# Patient Record
Sex: FEMALE | Race: WHITE | NOT HISPANIC OR LATINO | Employment: UNEMPLOYED | URBAN - METROPOLITAN AREA
[De-identification: names, ages, dates, MRNs, and addresses within clinical notes are randomized per-mention and may not be internally consistent; named-entity substitution may affect disease eponyms.]

---
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
## 2019-09-02 ENCOUNTER — HOSPITAL ENCOUNTER (EMERGENCY)
Facility: HOSPITAL | Age: 19
Discharge: HOME | End: 2019-09-02
Attending: FAMILY MEDICINE
Payer: MEDICAID

## 2019-09-02 VITALS
TEMPERATURE: 98 F | HEIGHT: 66 IN | BODY MASS INDEX: 47.09 KG/M2 | OXYGEN SATURATION: 98 % | RESPIRATION RATE: 16 BRPM | SYSTOLIC BLOOD PRESSURE: 137 MMHG | DIASTOLIC BLOOD PRESSURE: 94 MMHG | WEIGHT: 293 LBS | HEART RATE: 81 BPM

## 2019-09-02 DIAGNOSIS — K08.89 PAIN, DENTAL: Primary | ICD-10-CM

## 2019-09-02 PROCEDURE — 99283 EMERGENCY DEPT VISIT LOW MDM: CPT

## 2019-09-02 RX ORDER — MELOXICAM 15 MG/1
15 TABLET ORAL DAILY
Qty: 40 TABLET | Refills: 1 | Status: SHIPPED | OUTPATIENT
Start: 2019-09-02 | End: 2021-09-16

## 2019-09-02 NOTE — ED PROVIDER NOTES
Encounter Date: 2019       History     Chief Complaint   Patient presents with    Dental Pain     19-year-old female presents complaining of pain to her right upper molar area I think I have gingivitis, patient also complains having dull substernal chest discomfort and is concerned about heartburn        Review of patient's allergies indicates:  No Known Allergies  Past Medical History:   Diagnosis Date    Dermoid cyst of scalp     Dizzy     Heart murmur of      Obesity      Past Surgical History:   Procedure Laterality Date    Excision of cyst of scalp  2018    EXCISION-MASS-HEAD/SCALP Left 2018    Performed by Jaylen Anderson MD at [facility] OR     Family History   Problem Relation Age of Onset    No Known Problems Mother     No Known Problems Father      Social History     Tobacco Use    Smoking status: Never Smoker    Smokeless tobacco: Never Used   Substance Use Topics    Alcohol use: No    Drug use: No     Review of Systems   Constitutional: Negative for fever.   HENT: Negative for sore throat.    Respiratory: Negative for shortness of breath.    Cardiovascular: Negative for chest pain.   Gastrointestinal: Negative for nausea.   Genitourinary: Negative for dysuria.   Musculoskeletal: Negative for back pain.   Skin: Negative for rash.   Neurological: Negative for weakness.   Hematological: Does not bruise/bleed easily.       Physical Exam     Initial Vitals [19 0946]   BP Pulse Resp Temp SpO2   (!) 137/94 81 16 98.2 °F (36.8 °C) 98 %      MAP       --         Physical Exam    Nursing note and vitals reviewed.  Constitutional: She appears well-developed and well-nourished. She is not diaphoretic. No distress.   HENT:   Head: Normocephalic and atraumatic.   Right Ear: External ear normal.   Left Ear: External ear normal.   Eyes: Pupils are equal, round, and reactive to light. Right eye exhibits no discharge. Left eye exhibits no discharge.   Neck: No tracheal deviation  present. No JVD present.   Cardiovascular: Exam reveals no friction rub.    No murmur heard.  Pulmonary/Chest: No stridor. No respiratory distress. She has no wheezes. She has no rales.   Abdominal: Bowel sounds are normal. She exhibits no distension.   Musculoskeletal: Normal range of motion.   Neurological: She is alert.   Skin: Skin is warm.   Psychiatric: She has a normal mood and affect.         ED Course   Procedures  Labs Reviewed - No data to display       Imaging Results    None                               Clinical Impression:       ICD-10-CM ICD-9-CM   1. Pain, dental K08.89 525.9                                Dylan Hendrix MD  09/02/19 1839

## 2021-07-06 ENCOUNTER — PATIENT OUTREACH (OUTPATIENT)
Dept: ADMINISTRATIVE | Facility: HOSPITAL | Age: 21
End: 2021-07-06

## 2021-08-12 ENCOUNTER — OFFICE VISIT (OUTPATIENT)
Dept: FAMILY MEDICINE | Facility: CLINIC | Age: 21
End: 2021-08-12
Payer: MEDICAID

## 2021-08-12 ENCOUNTER — LAB VISIT (OUTPATIENT)
Dept: FAMILY MEDICINE | Facility: CLINIC | Age: 21
End: 2021-08-12
Payer: MEDICAID

## 2021-08-12 VITALS
DIASTOLIC BLOOD PRESSURE: 83 MMHG | SYSTOLIC BLOOD PRESSURE: 121 MMHG | HEIGHT: 66 IN | OXYGEN SATURATION: 99 % | RESPIRATION RATE: 16 BRPM | HEART RATE: 104 BPM | WEIGHT: 293 LBS | BODY MASS INDEX: 47.09 KG/M2

## 2021-08-12 DIAGNOSIS — Z13.1 ENCOUNTER FOR SCREENING FOR DIABETES MELLITUS: ICD-10-CM

## 2021-08-12 DIAGNOSIS — Z82.49 FAMILY HISTORY OF HEART DISEASE IN FEMALE FAMILY MEMBER BEFORE AGE 65: ICD-10-CM

## 2021-08-12 DIAGNOSIS — Z13.220 ENCOUNTER FOR LIPID SCREENING FOR CARDIOVASCULAR DISEASE: ICD-10-CM

## 2021-08-12 DIAGNOSIS — Z13.6 ENCOUNTER FOR LIPID SCREENING FOR CARDIOVASCULAR DISEASE: ICD-10-CM

## 2021-08-12 DIAGNOSIS — Z82.49 FAMILY HISTORY OF HEART DISEASE IN MALE FAMILY MEMBER BEFORE AGE 55: ICD-10-CM

## 2021-08-12 DIAGNOSIS — E66.01 CLASS 3 SEVERE OBESITY WITH BODY MASS INDEX (BMI) OF 45.0 TO 49.9 IN ADULT, UNSPECIFIED OBESITY TYPE, UNSPECIFIED WHETHER SERIOUS COMORBIDITY PRESENT: Chronic | ICD-10-CM

## 2021-08-12 DIAGNOSIS — Z11.59 ENCOUNTER FOR HEPATITIS C SCREENING TEST FOR LOW RISK PATIENT: ICD-10-CM

## 2021-08-12 DIAGNOSIS — R00.2 PALPITATIONS: ICD-10-CM

## 2021-08-12 DIAGNOSIS — Z76.89 ESTABLISHING CARE WITH NEW DOCTOR, ENCOUNTER FOR: Primary | ICD-10-CM

## 2021-08-12 DIAGNOSIS — E66.01 CLASS 3 SEVERE OBESITY WITH BODY MASS INDEX (BMI) OF 45.0 TO 49.9 IN ADULT, UNSPECIFIED OBESITY TYPE, UNSPECIFIED WHETHER SERIOUS COMORBIDITY PRESENT: ICD-10-CM

## 2021-08-12 LAB
ALBUMIN SERPL BCP-MCNC: 3.7 G/DL (ref 3.5–5.2)
ALP SERPL-CCNC: 61 U/L (ref 55–135)
ALT SERPL W/O P-5'-P-CCNC: 18 U/L (ref 10–44)
ANION GAP SERPL CALC-SCNC: 7 MMOL/L (ref 8–16)
AST SERPL-CCNC: 18 U/L (ref 10–40)
BILIRUB SERPL-MCNC: 0.6 MG/DL (ref 0.1–1)
BUN SERPL-MCNC: 9 MG/DL (ref 6–20)
CALCIUM SERPL-MCNC: 8.6 MG/DL (ref 8.7–10.5)
CHLORIDE SERPL-SCNC: 106 MMOL/L (ref 95–110)
CHOLEST SERPL-MCNC: 136 MG/DL (ref 120–199)
CHOLEST/HDLC SERPL: 3.6 {RATIO} (ref 2–5)
CO2 SERPL-SCNC: 25 MMOL/L (ref 23–29)
CREAT SERPL-MCNC: 0.8 MG/DL (ref 0.5–1.4)
EST. GFR  (AFRICAN AMERICAN): >60 ML/MIN/1.73 M^2
EST. GFR  (NON AFRICAN AMERICAN): >60 ML/MIN/1.73 M^2
ESTIMATED AVG GLUCOSE: 91 MG/DL (ref 68–131)
GLUCOSE SERPL-MCNC: 102 MG/DL (ref 70–110)
HBA1C MFR BLD: 4.8 % (ref 4–5.6)
HDLC SERPL-MCNC: 38 MG/DL (ref 40–75)
HDLC SERPL: 27.9 % (ref 20–50)
LDLC SERPL CALC-MCNC: 83.8 MG/DL (ref 63–159)
NONHDLC SERPL-MCNC: 98 MG/DL
POTASSIUM SERPL-SCNC: 3.7 MMOL/L (ref 3.5–5.1)
PROT SERPL-MCNC: 7.4 G/DL (ref 6–8.4)
SODIUM SERPL-SCNC: 138 MMOL/L (ref 136–145)
T4 FREE SERPL-MCNC: 1.15 NG/DL (ref 0.71–1.51)
TRIGL SERPL-MCNC: 71 MG/DL (ref 30–150)
TSH SERPL DL<=0.005 MIU/L-ACNC: 2.87 UIU/ML (ref 0.4–4)

## 2021-08-12 PROCEDURE — 93010 EKG 12-LEAD: ICD-10-PCS | Mod: S$PBB,,, | Performed by: INTERNAL MEDICINE

## 2021-08-12 PROCEDURE — 93005 ELECTROCARDIOGRAM TRACING: CPT | Mod: PBBFAC,PN | Performed by: INTERNAL MEDICINE

## 2021-08-12 PROCEDURE — 80053 COMPREHEN METABOLIC PANEL: CPT | Performed by: FAMILY MEDICINE

## 2021-08-12 PROCEDURE — 86803 HEPATITIS C AB TEST: CPT | Performed by: FAMILY MEDICINE

## 2021-08-12 PROCEDURE — 99205 OFFICE O/P NEW HI 60 MIN: CPT | Mod: S$PBB,,, | Performed by: FAMILY MEDICINE

## 2021-08-12 PROCEDURE — 93010 ELECTROCARDIOGRAM REPORT: CPT | Mod: S$PBB,,, | Performed by: INTERNAL MEDICINE

## 2021-08-12 PROCEDURE — 83036 HEMOGLOBIN GLYCOSYLATED A1C: CPT | Performed by: FAMILY MEDICINE

## 2021-08-12 PROCEDURE — 84439 ASSAY OF FREE THYROXINE: CPT | Performed by: FAMILY MEDICINE

## 2021-08-12 PROCEDURE — 84443 ASSAY THYROID STIM HORMONE: CPT | Performed by: FAMILY MEDICINE

## 2021-08-12 PROCEDURE — 99214 OFFICE O/P EST MOD 30 MIN: CPT | Mod: PBBFAC,PN | Performed by: FAMILY MEDICINE

## 2021-08-12 PROCEDURE — 99999 PR PBB SHADOW E&M-EST. PATIENT-LVL IV: ICD-10-PCS | Mod: PBBFAC,,, | Performed by: FAMILY MEDICINE

## 2021-08-12 PROCEDURE — 99999 PR PBB SHADOW E&M-EST. PATIENT-LVL IV: CPT | Mod: PBBFAC,,, | Performed by: FAMILY MEDICINE

## 2021-08-12 PROCEDURE — 99205 PR OFFICE/OUTPT VISIT, NEW, LEVL V, 60-74 MIN: ICD-10-PCS | Mod: S$PBB,,, | Performed by: FAMILY MEDICINE

## 2021-08-12 PROCEDURE — 80061 LIPID PANEL: CPT | Performed by: FAMILY MEDICINE

## 2021-08-13 LAB — HCV AB SERPL QL IA: NEGATIVE

## 2021-08-14 ENCOUNTER — HOSPITAL ENCOUNTER (EMERGENCY)
Facility: HOSPITAL | Age: 21
Discharge: HOME | End: 2021-08-14
Payer: MEDICAID

## 2021-08-14 VITALS
RESPIRATION RATE: 18 BRPM | HEIGHT: 66 IN | TEMPERATURE: 99 F | DIASTOLIC BLOOD PRESSURE: 88 MMHG | HEART RATE: 102 BPM | BODY MASS INDEX: 47.09 KG/M2 | SYSTOLIC BLOOD PRESSURE: 132 MMHG | OXYGEN SATURATION: 100 % | WEIGHT: 293 LBS

## 2021-08-14 DIAGNOSIS — U07.1 COVID-19: Primary | ICD-10-CM

## 2021-08-14 LAB — SARS-COV-2 RDRP RESP QL NAA+PROBE: POSITIVE

## 2021-08-14 PROCEDURE — 99284 EMERGENCY DEPT VISIT MOD MDM: CPT

## 2021-08-14 PROCEDURE — U0002 COVID-19 LAB TEST NON-CDC: HCPCS | Performed by: EMERGENCY MEDICINE

## 2021-08-14 RX ORDER — BENZONATATE 100 MG/1
100 CAPSULE ORAL 3 TIMES DAILY PRN
Qty: 20 CAPSULE | Refills: 0 | Status: SHIPPED | OUTPATIENT
Start: 2021-08-14 | End: 2021-08-24

## 2021-08-14 RX ORDER — AZITHROMYCIN 250 MG/1
250 TABLET, FILM COATED ORAL DAILY
Qty: 6 TABLET | Refills: 0 | Status: SHIPPED | OUTPATIENT
Start: 2021-08-14 | End: 2021-09-16 | Stop reason: ALTCHOICE

## 2021-08-16 ENCOUNTER — PATIENT OUTREACH (OUTPATIENT)
Dept: ADMINISTRATIVE | Facility: HOSPITAL | Age: 21
End: 2021-08-16

## 2021-08-16 ENCOUNTER — PATIENT MESSAGE (OUTPATIENT)
Dept: ADMINISTRATIVE | Facility: HOSPITAL | Age: 21
End: 2021-08-16

## 2021-08-16 ENCOUNTER — HOSPITAL ENCOUNTER (EMERGENCY)
Facility: HOSPITAL | Age: 21
Discharge: HOME | End: 2021-08-16
Payer: MEDICAID

## 2021-08-16 ENCOUNTER — NURSE TRIAGE (OUTPATIENT)
Dept: ADMINISTRATIVE | Facility: CLINIC | Age: 21
End: 2021-08-16

## 2021-08-16 VITALS
TEMPERATURE: 99 F | RESPIRATION RATE: 20 BRPM | HEIGHT: 66 IN | SYSTOLIC BLOOD PRESSURE: 157 MMHG | WEIGHT: 293 LBS | DIASTOLIC BLOOD PRESSURE: 99 MMHG | BODY MASS INDEX: 47.09 KG/M2 | HEART RATE: 115 BPM | OXYGEN SATURATION: 100 %

## 2021-08-16 DIAGNOSIS — U07.1 COVID-19: Primary | ICD-10-CM

## 2021-08-16 PROCEDURE — 99283 EMERGENCY DEPT VISIT LOW MDM: CPT

## 2021-08-16 RX ORDER — ALBUTEROL SULFATE 90 UG/1
1-2 AEROSOL, METERED RESPIRATORY (INHALATION) EVERY 6 HOURS PRN
Qty: 18 G | Refills: 1 | Status: SHIPPED | OUTPATIENT
Start: 2021-08-16 | End: 2023-09-27 | Stop reason: HOSPADM

## 2021-08-17 ENCOUNTER — INFUSION (OUTPATIENT)
Dept: INFECTIOUS DISEASES | Facility: HOSPITAL | Age: 21
End: 2021-08-17
Attending: EMERGENCY MEDICINE
Payer: MEDICAID

## 2021-08-17 VITALS
TEMPERATURE: 100 F | SYSTOLIC BLOOD PRESSURE: 123 MMHG | RESPIRATION RATE: 20 BRPM | DIASTOLIC BLOOD PRESSURE: 84 MMHG | HEART RATE: 109 BPM | OXYGEN SATURATION: 99 %

## 2021-08-17 DIAGNOSIS — U07.1 COVID-19: ICD-10-CM

## 2021-08-17 PROCEDURE — 63600175 PHARM REV CODE 636 W HCPCS: Performed by: EMERGENCY MEDICINE

## 2021-08-17 PROCEDURE — M0243 CASIRIVI AND IMDEVI INFUSION: HCPCS | Performed by: EMERGENCY MEDICINE

## 2021-08-17 PROCEDURE — 25000003 PHARM REV CODE 250: Performed by: EMERGENCY MEDICINE

## 2021-08-17 RX ORDER — SODIUM CHLORIDE 0.9 % (FLUSH) 0.9 %
10 SYRINGE (ML) INJECTION
Status: ACTIVE | OUTPATIENT
Start: 2021-08-17

## 2021-08-17 RX ORDER — ONDANSETRON 4 MG/1
4 TABLET, ORALLY DISINTEGRATING ORAL ONCE AS NEEDED
Status: DISCONTINUED | OUTPATIENT
Start: 2021-08-17 | End: 2023-02-20

## 2021-08-17 RX ORDER — DIPHENHYDRAMINE HYDROCHLORIDE 50 MG/ML
25 INJECTION INTRAMUSCULAR; INTRAVENOUS ONCE AS NEEDED
Status: ACTIVE | OUTPATIENT
Start: 2021-08-17 | End: 2033-01-13

## 2021-08-17 RX ORDER — ALBUTEROL SULFATE 90 UG/1
2 AEROSOL, METERED RESPIRATORY (INHALATION)
Status: DISPENSED | OUTPATIENT
Start: 2021-08-17

## 2021-08-17 RX ORDER — EPINEPHRINE 0.3 MG/.3ML
0.3 INJECTION SUBCUTANEOUS
Status: ACTIVE | OUTPATIENT
Start: 2021-08-17

## 2021-08-17 RX ORDER — ACETAMINOPHEN 325 MG/1
650 TABLET ORAL ONCE AS NEEDED
Status: ACTIVE | OUTPATIENT
Start: 2021-08-17 | End: 2033-01-13

## 2021-08-17 RX ADMIN — CASIRIVIMAB AND IMDEVIMAB 1200 MG: 600; 600 INJECTION, SOLUTION, CONCENTRATE INTRAVENOUS at 09:08

## 2021-08-25 ENCOUNTER — LAB VISIT (OUTPATIENT)
Dept: FAMILY MEDICINE | Facility: CLINIC | Age: 21
End: 2021-08-25
Payer: MEDICAID

## 2021-08-25 DIAGNOSIS — Z11.52 ENCOUNTER FOR SCREENING FOR COVID-19: Primary | ICD-10-CM

## 2021-08-25 PROCEDURE — U0003 INFECTIOUS AGENT DETECTION BY NUCLEIC ACID (DNA OR RNA); SEVERE ACUTE RESPIRATORY SYNDROME CORONAVIRUS 2 (SARS-COV-2) (CORONAVIRUS DISEASE [COVID-19]), AMPLIFIED PROBE TECHNIQUE, MAKING USE OF HIGH THROUGHPUT TECHNOLOGIES AS DESCRIBED BY CMS-2020-01-R: HCPCS | Performed by: FAMILY MEDICINE

## 2021-08-25 PROCEDURE — U0005 INFEC AGEN DETEC AMPLI PROBE: HCPCS | Performed by: FAMILY MEDICINE

## 2021-08-26 ENCOUNTER — OFFICE VISIT (OUTPATIENT)
Dept: FAMILY MEDICINE | Facility: CLINIC | Age: 21
End: 2021-08-26
Payer: MEDICAID

## 2021-08-26 DIAGNOSIS — Z71.3 WEIGHT LOSS COUNSELING, ENCOUNTER FOR: ICD-10-CM

## 2021-08-26 DIAGNOSIS — U07.1 COVID-19: Primary | ICD-10-CM

## 2021-08-26 DIAGNOSIS — E66.01 CLASS 3 SEVERE OBESITY WITH BODY MASS INDEX (BMI) OF 45.0 TO 49.9 IN ADULT, UNSPECIFIED OBESITY TYPE, UNSPECIFIED WHETHER SERIOUS COMORBIDITY PRESENT: ICD-10-CM

## 2021-08-26 LAB
SARS-COV-2 RNA RESP QL NAA+PROBE: DETECTED
SARS-COV-2- CYCLE NUMBER: 33

## 2021-08-26 PROCEDURE — 99214 OFFICE O/P EST MOD 30 MIN: CPT | Mod: GT,,, | Performed by: FAMILY MEDICINE

## 2021-08-26 PROCEDURE — 99214 PR OFFICE/OUTPT VISIT, EST, LEVL IV, 30-39 MIN: ICD-10-PCS | Mod: GT,,, | Performed by: FAMILY MEDICINE

## 2021-08-26 RX ORDER — AZELASTINE 1 MG/ML
1 SPRAY, METERED NASAL 2 TIMES DAILY
Qty: 30 ML | Refills: 3 | Status: SHIPPED | OUTPATIENT
Start: 2021-08-26 | End: 2023-09-27

## 2021-09-09 ENCOUNTER — DOCUMENTATION ONLY (OUTPATIENT)
Dept: FAMILY MEDICINE | Facility: CLINIC | Age: 21
End: 2021-09-09

## 2021-09-16 ENCOUNTER — OFFICE VISIT (OUTPATIENT)
Dept: CARDIOLOGY | Facility: CLINIC | Age: 21
End: 2021-09-16
Payer: MEDICAID

## 2021-09-16 VITALS
SYSTOLIC BLOOD PRESSURE: 169 MMHG | OXYGEN SATURATION: 99 % | RESPIRATION RATE: 16 BRPM | BODY MASS INDEX: 47.09 KG/M2 | HEIGHT: 66 IN | WEIGHT: 293 LBS | HEART RATE: 98 BPM | DIASTOLIC BLOOD PRESSURE: 98 MMHG

## 2021-09-16 DIAGNOSIS — G47.19 EXCESSIVE DAYTIME SLEEPINESS: ICD-10-CM

## 2021-09-16 DIAGNOSIS — E78.6 LOW HDL (UNDER 40): ICD-10-CM

## 2021-09-16 DIAGNOSIS — Z82.49 FAMILY HISTORY OF HEART DISEASE IN MALE FAMILY MEMBER BEFORE AGE 55: ICD-10-CM

## 2021-09-16 DIAGNOSIS — R01.1 HEART MURMUR: ICD-10-CM

## 2021-09-16 DIAGNOSIS — R00.2 PALPITATIONS: Primary | ICD-10-CM

## 2021-09-16 DIAGNOSIS — Z82.0 FAMILY HISTORY OF SLEEP APNEA: ICD-10-CM

## 2021-09-16 DIAGNOSIS — E66.01 MORBID OBESITY: ICD-10-CM

## 2021-09-16 DIAGNOSIS — Z82.49 FAMILY HISTORY OF HEART DISEASE IN FEMALE FAMILY MEMBER BEFORE AGE 65: ICD-10-CM

## 2021-09-16 PROBLEM — R74.8 LOW SERUM HDL: Status: ACTIVE | Noted: 2021-09-16

## 2021-09-16 PROBLEM — H40.9 GLAUCOMA: Status: ACTIVE | Noted: 2021-09-16

## 2021-09-16 PROCEDURE — 99205 OFFICE O/P NEW HI 60 MIN: CPT | Mod: S$PBB,,, | Performed by: INTERNAL MEDICINE

## 2021-09-16 PROCEDURE — 99214 OFFICE O/P EST MOD 30 MIN: CPT | Mod: PBBFAC,PN | Performed by: INTERNAL MEDICINE

## 2021-09-16 PROCEDURE — 99999 PR PBB SHADOW E&M-EST. PATIENT-LVL IV: ICD-10-PCS | Mod: PBBFAC,,, | Performed by: INTERNAL MEDICINE

## 2021-09-16 PROCEDURE — 99999 PR PBB SHADOW E&M-EST. PATIENT-LVL IV: CPT | Mod: PBBFAC,,, | Performed by: INTERNAL MEDICINE

## 2021-09-16 PROCEDURE — 99205 PR OFFICE/OUTPT VISIT, NEW, LEVL V, 60-74 MIN: ICD-10-PCS | Mod: S$PBB,,, | Performed by: INTERNAL MEDICINE

## 2021-09-29 ENCOUNTER — HOSPITAL ENCOUNTER (EMERGENCY)
Facility: HOSPITAL | Age: 21
Discharge: HOME | End: 2021-09-29
Attending: EMERGENCY MEDICINE
Payer: MEDICAID

## 2021-09-29 VITALS
HEIGHT: 67 IN | DIASTOLIC BLOOD PRESSURE: 61 MMHG | SYSTOLIC BLOOD PRESSURE: 126 MMHG | RESPIRATION RATE: 18 BRPM | HEART RATE: 88 BPM | OXYGEN SATURATION: 100 % | WEIGHT: 293 LBS | TEMPERATURE: 98 F | BODY MASS INDEX: 45.99 KG/M2
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
## 2021-09-29 DIAGNOSIS — T14.8XXA BITE BY ANIMAL: ICD-10-CM

## 2021-09-29 DIAGNOSIS — S41.112A LACERATION OF LEFT UPPER EXTREMITY, INITIAL ENCOUNTER: Primary | ICD-10-CM

## 2021-09-29 PROCEDURE — 90472 IMMUNIZATION ADMIN EACH ADD: CPT | Performed by: EMERGENCY MEDICINE

## 2021-09-29 PROCEDURE — 90675 RABIES VACCINE IM: CPT | Mod: JG,UD | Performed by: EMERGENCY MEDICINE

## 2021-09-29 PROCEDURE — 90715 TDAP VACCINE 7 YRS/> IM: CPT | Performed by: EMERGENCY MEDICINE

## 2021-09-29 PROCEDURE — 25000003 PHARM REV CODE 250: Performed by: EMERGENCY MEDICINE

## 2021-09-29 PROCEDURE — 73110 XR WRIST COMPLETE 3 VIEWS LEFT: ICD-10-PCS | Mod: 26,LT,, | Performed by: RADIOLOGY

## 2021-09-29 PROCEDURE — 99284 EMERGENCY DEPT VISIT MOD MDM: CPT | Mod: 25

## 2021-09-29 PROCEDURE — 63600175 PHARM REV CODE 636 W HCPCS: Performed by: EMERGENCY MEDICINE

## 2021-09-29 PROCEDURE — 73110 X-RAY EXAM OF WRIST: CPT | Mod: TC,FY,LT

## 2021-09-29 PROCEDURE — 73110 X-RAY EXAM OF WRIST: CPT | Mod: 26,LT,, | Performed by: RADIOLOGY

## 2021-09-29 PROCEDURE — 96372 THER/PROPH/DIAG INJ SC/IM: CPT

## 2021-09-29 PROCEDURE — 90471 IMMUNIZATION ADMIN: CPT | Performed by: EMERGENCY MEDICINE

## 2021-09-29 PROCEDURE — 12002 RPR S/N/AX/GEN/TRNK2.6-7.5CM: CPT

## 2021-09-29 RX ORDER — KETOROLAC TROMETHAMINE 30 MG/ML
15 INJECTION, SOLUTION INTRAMUSCULAR; INTRAVENOUS
Status: COMPLETED | OUTPATIENT
Start: 2021-09-29 | End: 2021-09-29

## 2021-09-29 RX ORDER — ACETAMINOPHEN 500 MG
1000 TABLET ORAL
Status: COMPLETED | OUTPATIENT
Start: 2021-09-29 | End: 2021-09-29

## 2021-09-29 RX ORDER — AMOXICILLIN AND CLAVULANATE POTASSIUM 875; 125 MG/1; MG/1
1 TABLET, FILM COATED ORAL 2 TIMES DAILY
Qty: 14 TABLET | Refills: 0 | Status: SHIPPED | OUTPATIENT
Start: 2021-09-29 | End: 2023-09-27

## 2021-09-29 RX ORDER — LIDOCAINE HYDROCHLORIDE AND EPINEPHRINE 10; 10 MG/ML; UG/ML
10 INJECTION, SOLUTION INFILTRATION; PERINEURAL
Status: COMPLETED | OUTPATIENT
Start: 2021-09-29 | End: 2021-09-29

## 2021-09-29 RX ADMIN — LIDOCAINE HYDROCHLORIDE,EPINEPHRINE BITARTRATE 10 ML: 10; .01 INJECTION, SOLUTION INFILTRATION; PERINEURAL at 07:09

## 2021-09-29 RX ADMIN — CLOSTRIDIUM TETANI TOXOID ANTIGEN (FORMALDEHYDE INACTIVATED), CORYNEBACTERIUM DIPHTHERIAE TOXOID ANTIGEN (FORMALDEHYDE INACTIVATED), BORDETELLA PERTUSSIS TOXOID ANTIGEN (GLUTARALDEHYDE INACTIVATED), BORDETELLA PERTUSSIS FILAMENTOUS HEMAGGLUTININ ANTIGEN (FORMALDEHYDE INACTIVATED), BORDETELLA PERTUSSIS PERTACTIN ANTIGEN, AND BORDETELLA PERTUSSIS FIMBRIAE 2/3 ANTIGEN 0.5 ML: 5; 2; 2.5; 5; 3; 5 INJECTION, SUSPENSION INTRAMUSCULAR at 07:09

## 2021-09-29 RX ADMIN — ACETAMINOPHEN 1000 MG: 500 TABLET ORAL at 07:09

## 2021-09-29 RX ADMIN — RABIES VACCINE 2.5 UNITS: KIT at 08:09

## 2021-09-29 RX ADMIN — KETOROLAC TROMETHAMINE 15 MG: 30 INJECTION, SOLUTION INTRAMUSCULAR at 09:09

## 2021-09-30 ENCOUNTER — HOSPITAL ENCOUNTER (EMERGENCY)
Facility: HOSPITAL | Age: 21
Discharge: HOME | End: 2021-09-30
Attending: EMERGENCY MEDICINE
Payer: MEDICAID

## 2021-09-30 VITALS
HEART RATE: 76 BPM | TEMPERATURE: 99 F | RESPIRATION RATE: 18 BRPM | DIASTOLIC BLOOD PRESSURE: 95 MMHG | HEIGHT: 67 IN | WEIGHT: 293 LBS | OXYGEN SATURATION: 100 % | SYSTOLIC BLOOD PRESSURE: 142 MMHG | BODY MASS INDEX: 45.99 KG/M2
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
## 2021-09-30 DIAGNOSIS — Z20.3 NEED FOR POST EXPOSURE PROPHYLAXIS FOR RABIES: ICD-10-CM

## 2021-09-30 PROCEDURE — 90377 RABIES IG HT&SOL HUMAN IM/SC: CPT | Performed by: EMERGENCY MEDICINE

## 2021-09-30 PROCEDURE — 99283 EMERGENCY DEPT VISIT LOW MDM: CPT

## 2021-09-30 PROCEDURE — 63600175 PHARM REV CODE 636 W HCPCS: Performed by: EMERGENCY MEDICINE

## 2021-09-30 RX ORDER — IBUPROFEN 800 MG/1
800 TABLET ORAL EVERY 8 HOURS PRN
Qty: 15 TABLET | Refills: 0 | Status: SHIPPED | OUTPATIENT
Start: 2021-09-30 | End: 2023-09-27

## 2021-09-30 RX ADMIN — HUMAN RABIES VIRUS IMMUNE GLOBULIN 2700 UNITS: 150 INJECTION, SOLUTION INTRAMUSCULAR at 09:09

## 2021-10-08 ENCOUNTER — HOSPITAL ENCOUNTER (EMERGENCY)
Facility: HOSPITAL | Age: 21
Discharge: HOME | End: 2021-10-08
Attending: FAMILY MEDICINE
Payer: MEDICAID

## 2021-10-08 VITALS
WEIGHT: 293 LBS | BODY MASS INDEX: 45.99 KG/M2 | RESPIRATION RATE: 16 BRPM | TEMPERATURE: 98 F | DIASTOLIC BLOOD PRESSURE: 78 MMHG | HEIGHT: 67 IN | HEART RATE: 79 BPM | SYSTOLIC BLOOD PRESSURE: 149 MMHG | OXYGEN SATURATION: 99 %

## 2021-10-08 DIAGNOSIS — Z48.02 VISIT FOR SUTURE REMOVAL: Primary | ICD-10-CM
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
## 2021-10-08 DIAGNOSIS — S91.059S: ICD-10-CM

## 2021-10-08 PROCEDURE — 99281 EMR DPT VST MAYX REQ PHY/QHP: CPT | Mod: 25

## 2021-10-08 PROCEDURE — 63600175 PHARM REV CODE 636 W HCPCS: Mod: JG,UD | Performed by: FAMILY MEDICINE

## 2021-10-08 PROCEDURE — 90675 RABIES VACCINE IM: CPT | Mod: JG,UD | Performed by: FAMILY MEDICINE

## 2021-10-08 PROCEDURE — 99900062 HC AFTER HR RABIES VACCINE ED REGISTRATION

## 2021-10-08 PROCEDURE — 90471 IMMUNIZATION ADMIN: CPT | Performed by: FAMILY MEDICINE

## 2021-10-08 RX ADMIN — RABIES VACCINE 2.5 UNITS: KIT at 07:10

## 2021-10-26 ENCOUNTER — OFFICE VISIT (OUTPATIENT)
Dept: FAMILY MEDICINE | Facility: CLINIC | Age: 21
End: 2021-10-26
Payer: MEDICAID

## 2021-10-26 VITALS
HEART RATE: 77 BPM | BODY MASS INDEX: 45.99 KG/M2 | WEIGHT: 293 LBS | SYSTOLIC BLOOD PRESSURE: 138 MMHG | OXYGEN SATURATION: 98 % | DIASTOLIC BLOOD PRESSURE: 90 MMHG | HEIGHT: 67 IN
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
## 2021-10-26 DIAGNOSIS — S61.552S: Primary | ICD-10-CM

## 2021-10-26 DIAGNOSIS — G47.33 OSA (OBSTRUCTIVE SLEEP APNEA): ICD-10-CM

## 2021-10-26 DIAGNOSIS — D23.4 CYST, DERMOID, SCALP AND NECK: ICD-10-CM

## 2021-10-26 PROCEDURE — 99999 PR PBB SHADOW E&M-EST. PATIENT-LVL V: CPT | Mod: PBBFAC,,, | Performed by: FAMILY MEDICINE

## 2021-10-26 PROCEDURE — 99214 OFFICE O/P EST MOD 30 MIN: CPT | Mod: S$PBB,,, | Performed by: FAMILY MEDICINE

## 2021-10-26 PROCEDURE — 99999 PR PBB SHADOW E&M-EST. PATIENT-LVL V: ICD-10-PCS | Mod: PBBFAC,,, | Performed by: FAMILY MEDICINE

## 2021-10-26 PROCEDURE — 99214 PR OFFICE/OUTPT VISIT, EST, LEVL IV, 30-39 MIN: ICD-10-PCS | Mod: S$PBB,,, | Performed by: FAMILY MEDICINE

## 2021-10-26 PROCEDURE — 99215 OFFICE O/P EST HI 40 MIN: CPT | Mod: PBBFAC,PN | Performed by: FAMILY MEDICINE

## 2021-10-27 ENCOUNTER — TELEPHONE (OUTPATIENT)
Dept: FAMILY MEDICINE | Facility: CLINIC | Age: 21
End: 2021-10-27
Payer: MEDICAID

## 2021-10-27 ENCOUNTER — PATIENT MESSAGE (OUTPATIENT)
Dept: FAMILY MEDICINE | Facility: CLINIC | Age: 21
End: 2021-10-27
Payer: MEDICAID

## 2021-11-08 ENCOUNTER — TELEPHONE (OUTPATIENT)
Dept: SURGERY | Facility: CLINIC | Age: 21
End: 2021-11-08
Payer: MEDICAID

## 2021-11-09 ENCOUNTER — PROCEDURE VISIT (OUTPATIENT)
Dept: SLEEP MEDICINE | Facility: HOSPITAL | Age: 21
End: 2021-11-09
Attending: FAMILY MEDICINE
Payer: MEDICAID

## 2021-11-09 DIAGNOSIS — G47.33 OSA (OBSTRUCTIVE SLEEP APNEA): ICD-10-CM

## 2021-11-09 PROCEDURE — 95810 POLYSOM 6/> YRS 4/> PARAM: CPT

## 2021-11-10 ENCOUNTER — HOSPITAL ENCOUNTER (OUTPATIENT)
Dept: RADIOLOGY | Facility: HOSPITAL | Age: 21
Discharge: HOME | End: 2021-11-10
Attending: FAMILY MEDICINE
Payer: MEDICAID

## 2021-11-10 DIAGNOSIS — S61.552S: ICD-10-CM
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
## 2021-11-10 PROCEDURE — 73200 CT WRIST WITHOUT CONTRAST LEFT: ICD-10-PCS | Mod: 26,LT,, | Performed by: RADIOLOGY

## 2021-11-10 PROCEDURE — 73200 CT UPPER EXTREMITY W/O DYE: CPT | Mod: 26,LT,, | Performed by: RADIOLOGY

## 2021-11-10 PROCEDURE — 73200 CT UPPER EXTREMITY W/O DYE: CPT | Mod: TC,LT

## 2021-11-12 ENCOUNTER — PATIENT MESSAGE (OUTPATIENT)
Dept: FAMILY MEDICINE | Facility: CLINIC | Age: 21
End: 2021-11-12
Payer: MEDICAID

## 2021-12-02 ENCOUNTER — OFFICE VISIT (OUTPATIENT)
Dept: SURGERY | Facility: CLINIC | Age: 21
End: 2021-12-02
Payer: MEDICAID

## 2021-12-02 ENCOUNTER — PATIENT OUTREACH (OUTPATIENT)
Dept: ADMINISTRATIVE | Facility: OTHER | Age: 21
End: 2021-12-02
Payer: MEDICAID

## 2021-12-02 ENCOUNTER — LAB VISIT (OUTPATIENT)
Dept: LAB | Facility: HOSPITAL | Age: 21
End: 2021-12-02
Attending: SURGERY
Payer: MEDICAID

## 2021-12-02 VITALS
HEIGHT: 67 IN | TEMPERATURE: 98 F | DIASTOLIC BLOOD PRESSURE: 80 MMHG | BODY MASS INDEX: 45.99 KG/M2 | OXYGEN SATURATION: 99 % | WEIGHT: 293 LBS | SYSTOLIC BLOOD PRESSURE: 138 MMHG | HEART RATE: 82 BPM

## 2021-12-02 DIAGNOSIS — L72.9 SCALP CYST: Primary | ICD-10-CM

## 2021-12-02 DIAGNOSIS — Z01.818 PRE-OP TESTING: ICD-10-CM

## 2021-12-02 DIAGNOSIS — D23.4 CYST, DERMOID, SCALP AND NECK: ICD-10-CM

## 2021-12-02 LAB
ALBUMIN SERPL BCP-MCNC: 4 G/DL (ref 3.5–5.2)
ALP SERPL-CCNC: 61 U/L (ref 55–135)
ALT SERPL W/O P-5'-P-CCNC: 13 U/L (ref 10–44)
ANION GAP SERPL CALC-SCNC: 9 MMOL/L (ref 8–16)
AST SERPL-CCNC: 20 U/L (ref 10–40)
BASOPHILS # BLD AUTO: 0.05 K/UL (ref 0–0.2)
BASOPHILS NFR BLD: 0.5 % (ref 0–1.9)
BILIRUB SERPL-MCNC: 0.4 MG/DL (ref 0.1–1)
BUN SERPL-MCNC: 9 MG/DL (ref 6–20)
CALCIUM SERPL-MCNC: 8.7 MG/DL (ref 8.7–10.5)
CHLORIDE SERPL-SCNC: 105 MMOL/L (ref 95–110)
CO2 SERPL-SCNC: 24 MMOL/L (ref 23–29)
CREAT SERPL-MCNC: 0.8 MG/DL (ref 0.5–1.4)
DIFFERENTIAL METHOD: NORMAL
EOSINOPHIL # BLD AUTO: 0.1 K/UL (ref 0–0.5)
EOSINOPHIL NFR BLD: 0.8 % (ref 0–8)
ERYTHROCYTE [DISTWIDTH] IN BLOOD BY AUTOMATED COUNT: 11.9 % (ref 11.5–14.5)
EST. GFR  (AFRICAN AMERICAN): >60 ML/MIN/1.73 M^2
EST. GFR  (NON AFRICAN AMERICAN): >60 ML/MIN/1.73 M^2
GLUCOSE SERPL-MCNC: 88 MG/DL (ref 70–110)
HCT VFR BLD AUTO: 41.1 % (ref 37–48.5)
HGB BLD-MCNC: 14 G/DL (ref 12–16)
IMM GRANULOCYTES # BLD AUTO: 0.03 K/UL (ref 0–0.04)
IMM GRANULOCYTES NFR BLD AUTO: 0.3 % (ref 0–0.5)
LYMPHOCYTES # BLD AUTO: 2.6 K/UL (ref 1–4.8)
LYMPHOCYTES NFR BLD: 26.5 % (ref 18–48)
MCH RBC QN AUTO: 29.2 PG (ref 27–31)
MCHC RBC AUTO-ENTMCNC: 34.1 G/DL (ref 32–36)
MCV RBC AUTO: 86 FL (ref 82–98)
MONOCYTES # BLD AUTO: 0.7 K/UL (ref 0.3–1)
MONOCYTES NFR BLD: 7.1 % (ref 4–15)
NEUTROPHILS # BLD AUTO: 6.4 K/UL (ref 1.8–7.7)
NEUTROPHILS NFR BLD: 64.8 % (ref 38–73)
NRBC BLD-RTO: 0 /100 WBC
PLATELET # BLD AUTO: 289 K/UL (ref 150–450)
PMV BLD AUTO: 10.2 FL (ref 9.2–12.9)
POTASSIUM SERPL-SCNC: 4.2 MMOL/L (ref 3.5–5.1)
PROT SERPL-MCNC: 7.6 G/DL (ref 6–8.4)
RBC # BLD AUTO: 4.8 M/UL (ref 4–5.4)
SODIUM SERPL-SCNC: 138 MMOL/L (ref 136–145)
WBC # BLD AUTO: 9.84 K/UL (ref 3.9–12.7)

## 2021-12-02 PROCEDURE — 99204 OFFICE O/P NEW MOD 45 MIN: CPT | Mod: S$PBB,,, | Performed by: SURGERY

## 2021-12-02 PROCEDURE — 99999 PR PBB SHADOW E&M-EST. PATIENT-LVL V: ICD-10-PCS | Mod: PBBFAC,,, | Performed by: SURGERY

## 2021-12-02 PROCEDURE — 36415 COLL VENOUS BLD VENIPUNCTURE: CPT | Performed by: SURGERY

## 2021-12-02 PROCEDURE — 99215 OFFICE O/P EST HI 40 MIN: CPT | Mod: PBBFAC | Performed by: SURGERY

## 2021-12-02 PROCEDURE — 80053 COMPREHEN METABOLIC PANEL: CPT | Performed by: SURGERY

## 2021-12-02 PROCEDURE — 99204 PR OFFICE/OUTPT VISIT, NEW, LEVL IV, 45-59 MIN: ICD-10-PCS | Mod: S$PBB,,, | Performed by: SURGERY

## 2021-12-02 PROCEDURE — 85025 COMPLETE CBC W/AUTO DIFF WBC: CPT | Performed by: SURGERY

## 2021-12-02 PROCEDURE — 99999 PR PBB SHADOW E&M-EST. PATIENT-LVL V: CPT | Mod: PBBFAC,,, | Performed by: SURGERY

## 2021-12-02 RX ORDER — SODIUM CHLORIDE 9 MG/ML
INJECTION, SOLUTION INTRAVENOUS CONTINUOUS
Status: CANCELLED | OUTPATIENT
Start: 2021-12-02

## 2021-12-09 ENCOUNTER — PATIENT MESSAGE (OUTPATIENT)
Dept: FAMILY MEDICINE | Facility: CLINIC | Age: 21
End: 2021-12-09
Payer: MEDICAID

## 2022-01-10 ENCOUNTER — PATIENT MESSAGE (OUTPATIENT)
Dept: ADMINISTRATIVE | Facility: HOSPITAL | Age: 22
End: 2022-01-10
Payer: MEDICAID

## 2022-02-16 ENCOUNTER — ANESTHESIA EVENT (OUTPATIENT)
Dept: SURGERY | Facility: HOSPITAL | Age: 22
End: 2022-02-16
Payer: MEDICAID

## 2022-03-02 ENCOUNTER — HOSPITAL ENCOUNTER (OUTPATIENT)
Facility: HOSPITAL | Age: 22
Discharge: HOME | End: 2022-03-02
Attending: SURGERY | Admitting: SURGERY
Payer: MEDICAID

## 2022-03-02 ENCOUNTER — ANESTHESIA (OUTPATIENT)
Dept: SURGERY | Facility: HOSPITAL | Age: 22
End: 2022-03-02
Payer: MEDICAID

## 2022-03-02 VITALS
WEIGHT: 293 LBS | BODY MASS INDEX: 45.99 KG/M2 | HEIGHT: 67 IN | HEART RATE: 89 BPM | RESPIRATION RATE: 12 BRPM | TEMPERATURE: 98 F | OXYGEN SATURATION: 100 % | SYSTOLIC BLOOD PRESSURE: 130 MMHG | DIASTOLIC BLOOD PRESSURE: 76 MMHG

## 2022-03-02 DIAGNOSIS — D23.4 CYST, DERMOID, SCALP AND NECK: Primary | ICD-10-CM

## 2022-03-02 DIAGNOSIS — L72.9 SCALP CYST: ICD-10-CM

## 2022-03-02 LAB
B-HCG UR QL: NEGATIVE
SARS-COV-2 RDRP RESP QL NAA+PROBE: NEGATIVE

## 2022-03-02 PROCEDURE — 11422 EXC H-F-NK-SP B9+MARG 1.1-2: CPT | Mod: ,,, | Performed by: SURGERY

## 2022-03-02 PROCEDURE — 00300 ANES ALL PX INTEG H/N/PTRUNK: CPT | Performed by: SURGERY

## 2022-03-02 PROCEDURE — 63600175 PHARM REV CODE 636 W HCPCS: Performed by: SURGERY

## 2022-03-02 PROCEDURE — 36000707: Performed by: SURGERY

## 2022-03-02 PROCEDURE — U0002 COVID-19 LAB TEST NON-CDC: HCPCS | Performed by: SURGERY

## 2022-03-02 PROCEDURE — 81025 URINE PREGNANCY TEST: CPT | Performed by: SURGERY

## 2022-03-02 PROCEDURE — 11421 PR EXC SKIN BENIG 0.6-1 CM REMAINDR BODY: ICD-10-PCS | Mod: ,,, | Performed by: SURGERY

## 2022-03-02 PROCEDURE — 88304 TISSUE EXAM BY PATHOLOGIST: CPT | Mod: 26,,, | Performed by: PATHOLOGY

## 2022-03-02 PROCEDURE — 37000008 HC ANESTHESIA 1ST 15 MINUTES: Performed by: SURGERY

## 2022-03-02 PROCEDURE — D9220A PRA ANESTHESIA: ICD-10-PCS | Mod: 23,ANES,, | Performed by: ANESTHESIOLOGY

## 2022-03-02 PROCEDURE — 25000003 PHARM REV CODE 250: Performed by: NURSE ANESTHETIST, CERTIFIED REGISTERED

## 2022-03-02 PROCEDURE — D9220A PRA ANESTHESIA: Mod: 23,ANES,, | Performed by: ANESTHESIOLOGY

## 2022-03-02 PROCEDURE — 36000706: Performed by: SURGERY

## 2022-03-02 PROCEDURE — 88304 PR  SURG PATH,LEVEL III: ICD-10-PCS | Mod: 26,,, | Performed by: PATHOLOGY

## 2022-03-02 PROCEDURE — 11421 EXC H-F-NK-SP B9+MARG 0.6-1: CPT | Mod: ,,, | Performed by: SURGERY

## 2022-03-02 PROCEDURE — 71000033 HC RECOVERY, INTIAL HOUR: Performed by: SURGERY

## 2022-03-02 PROCEDURE — D9220A PRA ANESTHESIA: ICD-10-PCS | Mod: 23,CRNA,, | Performed by: NURSE ANESTHETIST, CERTIFIED REGISTERED

## 2022-03-02 PROCEDURE — 88304 TISSUE EXAM BY PATHOLOGIST: CPT | Mod: 59 | Performed by: PATHOLOGY

## 2022-03-02 PROCEDURE — 71000015 HC POSTOP RECOV 1ST HR: Performed by: SURGERY

## 2022-03-02 PROCEDURE — 11422 PR EXC SKIN BENIG 1.1-2 CM REMAINDR BODY: ICD-10-PCS | Mod: ,,, | Performed by: SURGERY

## 2022-03-02 PROCEDURE — 63600175 PHARM REV CODE 636 W HCPCS: Performed by: NURSE ANESTHETIST, CERTIFIED REGISTERED

## 2022-03-02 PROCEDURE — D9220A PRA ANESTHESIA: Mod: 23,CRNA,, | Performed by: NURSE ANESTHETIST, CERTIFIED REGISTERED

## 2022-03-02 PROCEDURE — 37000009 HC ANESTHESIA EA ADD 15 MINS: Performed by: SURGERY

## 2022-03-02 RX ORDER — LIDOCAINE HYDROCHLORIDE 20 MG/ML
INJECTION, SOLUTION EPIDURAL; INFILTRATION; INTRACAUDAL; PERINEURAL
Status: DISCONTINUED | OUTPATIENT
Start: 2022-03-02 | End: 2022-03-02

## 2022-03-02 RX ORDER — ONDANSETRON 2 MG/ML
INJECTION INTRAMUSCULAR; INTRAVENOUS
Status: DISCONTINUED | OUTPATIENT
Start: 2022-03-02 | End: 2022-03-02

## 2022-03-02 RX ORDER — MORPHINE SULFATE 4 MG/ML
2 INJECTION, SOLUTION INTRAMUSCULAR; INTRAVENOUS EVERY 5 MIN PRN
Status: DISCONTINUED | OUTPATIENT
Start: 2022-03-02 | End: 2022-03-02 | Stop reason: HOSPADM

## 2022-03-02 RX ORDER — MIDAZOLAM HYDROCHLORIDE 1 MG/ML
INJECTION INTRAMUSCULAR; INTRAVENOUS
Status: DISCONTINUED | OUTPATIENT
Start: 2022-03-02 | End: 2022-03-02

## 2022-03-02 RX ORDER — SODIUM CHLORIDE, SODIUM LACTATE, POTASSIUM CHLORIDE, CALCIUM CHLORIDE 600; 310; 30; 20 MG/100ML; MG/100ML; MG/100ML; MG/100ML
125 INJECTION, SOLUTION INTRAVENOUS CONTINUOUS
Status: DISCONTINUED | OUTPATIENT
Start: 2022-03-02 | End: 2022-03-02 | Stop reason: HOSPADM

## 2022-03-02 RX ORDER — ONDANSETRON 4 MG/1
4 TABLET, FILM COATED ORAL EVERY 6 HOURS PRN
Qty: 30 TABLET | Refills: 0 | Status: SHIPPED | OUTPATIENT
Start: 2022-03-02 | End: 2022-03-12

## 2022-03-02 RX ORDER — SUCCINYLCHOLINE CHLORIDE 20 MG/ML
INJECTION INTRAMUSCULAR; INTRAVENOUS
Status: DISCONTINUED | OUTPATIENT
Start: 2022-03-02 | End: 2022-03-02

## 2022-03-02 RX ORDER — ONDANSETRON 2 MG/ML
4 INJECTION INTRAMUSCULAR; INTRAVENOUS DAILY PRN
Status: DISCONTINUED | OUTPATIENT
Start: 2022-03-02 | End: 2022-03-02 | Stop reason: HOSPADM

## 2022-03-02 RX ORDER — DEXAMETHASONE SODIUM PHOSPHATE 4 MG/ML
INJECTION, SOLUTION INTRA-ARTICULAR; INTRALESIONAL; INTRAMUSCULAR; INTRAVENOUS; SOFT TISSUE
Status: DISCONTINUED | OUTPATIENT
Start: 2022-03-02 | End: 2022-03-02

## 2022-03-02 RX ORDER — SODIUM CHLORIDE, SODIUM LACTATE, POTASSIUM CHLORIDE, CALCIUM CHLORIDE 600; 310; 30; 20 MG/100ML; MG/100ML; MG/100ML; MG/100ML
INJECTION, SOLUTION INTRAVENOUS CONTINUOUS PRN
Status: DISCONTINUED | OUTPATIENT
Start: 2022-03-02 | End: 2022-03-02

## 2022-03-02 RX ORDER — SODIUM CHLORIDE 9 MG/ML
INJECTION, SOLUTION INTRAVENOUS CONTINUOUS
Status: DISCONTINUED | OUTPATIENT
Start: 2022-03-02 | End: 2022-03-02 | Stop reason: HOSPADM

## 2022-03-02 RX ORDER — OXYCODONE AND ACETAMINOPHEN 5; 325 MG/1; MG/1
1 TABLET ORAL EVERY 4 HOURS PRN
Qty: 20 TABLET | Refills: 0 | Status: SHIPPED | OUTPATIENT
Start: 2022-03-02 | End: 2022-03-12

## 2022-03-02 RX ORDER — CEFAZOLIN SODIUM 2 G/50ML
2 SOLUTION INTRAVENOUS
Status: COMPLETED | OUTPATIENT
Start: 2022-03-02 | End: 2022-03-02

## 2022-03-02 RX ORDER — OXYCODONE AND ACETAMINOPHEN 5; 325 MG/1; MG/1
1 TABLET ORAL
Status: DISCONTINUED | OUTPATIENT
Start: 2022-03-02 | End: 2022-03-02 | Stop reason: HOSPADM

## 2022-03-02 RX ORDER — LIDOCAINE HYDROCHLORIDE 10 MG/ML
1 INJECTION, SOLUTION EPIDURAL; INFILTRATION; INTRACAUDAL; PERINEURAL ONCE
Status: CANCELLED | OUTPATIENT
Start: 2022-03-02 | End: 2022-03-02

## 2022-03-02 RX ORDER — MEPERIDINE HYDROCHLORIDE 50 MG/ML
INJECTION INTRAMUSCULAR; INTRAVENOUS; SUBCUTANEOUS
Status: DISCONTINUED | OUTPATIENT
Start: 2022-03-02 | End: 2022-03-02

## 2022-03-02 RX ORDER — SODIUM CHLORIDE, SODIUM LACTATE, POTASSIUM CHLORIDE, CALCIUM CHLORIDE 600; 310; 30; 20 MG/100ML; MG/100ML; MG/100ML; MG/100ML
INJECTION, SOLUTION INTRAVENOUS CONTINUOUS
Status: CANCELLED | OUTPATIENT
Start: 2022-03-02

## 2022-03-02 RX ORDER — PROPOFOL 10 MG/ML
VIAL (ML) INTRAVENOUS
Status: DISCONTINUED | OUTPATIENT
Start: 2022-03-02 | End: 2022-03-02

## 2022-03-02 RX ADMIN — PROPOFOL 200 MG: 10 INJECTION, EMULSION INTRAVENOUS at 10:03

## 2022-03-02 RX ADMIN — MEPERIDINE HYDROCHLORIDE 50 MG: 50 INJECTION INTRAMUSCULAR; INTRAVENOUS; SUBCUTANEOUS at 10:03

## 2022-03-02 RX ADMIN — SUCCINYLCHOLINE CHLORIDE 100 MG: 20 INJECTION, SOLUTION INTRAMUSCULAR; INTRAVENOUS at 11:03

## 2022-03-02 RX ADMIN — CEFAZOLIN SODIUM 2 G: 2 SOLUTION INTRAVENOUS at 10:03

## 2022-03-02 RX ADMIN — ONDANSETRON 4 MG: 2 INJECTION INTRAMUSCULAR; INTRAVENOUS at 11:03

## 2022-03-02 RX ADMIN — MIDAZOLAM HYDROCHLORIDE 2 MG: 1 INJECTION, SOLUTION INTRAMUSCULAR; INTRAVENOUS at 10:03

## 2022-03-02 RX ADMIN — LIDOCAINE HYDROCHLORIDE 100 MG: 20 INJECTION, SOLUTION EPIDURAL; INFILTRATION; INTRACAUDAL; PERINEURAL at 10:03

## 2022-03-02 RX ADMIN — SODIUM CHLORIDE, POTASSIUM CHLORIDE, SODIUM LACTATE AND CALCIUM CHLORIDE: 600; 310; 30; 20 INJECTION, SOLUTION INTRAVENOUS at 10:03

## 2022-03-02 RX ADMIN — DEXAMETHASONE SODIUM PHOSPHATE 4 MG: 4 INJECTION, SOLUTION INTRAMUSCULAR; INTRAVENOUS at 11:03

## 2022-03-02 NOTE — ANESTHESIA PROCEDURE NOTES
Intubation    Date/Time: 3/2/2022 11:00 AM  Performed by: Evy You CRNA  Authorized by: Noah Huber MD     Intubation:     Intubated:  Postinduction    Mask Ventilation:  Easy mask    Attempts:  1    Attempted By:  CRNA    Method of Intubation:  Direct    Blade:  Conklin 2    Laryngeal View Grade: Grade I - full view of cords      Difficult Airway Encountered?: No      Complications:  None    Airway Device:  Oral endotracheal tube    Airway Device Size:  7.0    Style/Cuff Inflation:  Cuffed (inflated to minimal occlusive pressure)    Tube secured:  22    Secured at:  The lips    Placement Verified By:  Capnometry    Complicating Factors:  None    Findings Post-Intubation:  BS equal bilateral and atraumatic/condition of teeth unchanged  Notes:      After placement of LMA, Dr Anderson needed the pt right lateral so changed to ETT

## 2022-03-02 NOTE — ANESTHESIA PROCEDURE NOTES
Intubation    Date/Time: 3/2/2022 10:42 AM  Performed by: Evy You CRNA  Authorized by: Noah Huber MD     Intubation:     Induction:  Intravenous    Intubated:  Postinduction    Mask Ventilation:  Easy mask    Attempts:  1    Attempted By:  CRNA    Difficult Airway Encountered?: No      Complications:  None    Airway Device:  Intubating LMA    Airway Device Size:  7.0    Style/Cuff Inflation:  Cuffed (inflated to minimal occlusive pressure)    Secured at:  The lips    Placement Verified By:  Capnometry    Complicating Factors:  None    Findings Post-Intubation:  BS equal bilateral and atraumatic/condition of teeth unchanged

## 2022-03-02 NOTE — H&P
[facility] Surgery H&P Note    Subjective:       Patient ID: Josesito Moore is a 21 y.o. female.    Chief Complaint:  I want my scalp cyst removed.  HPI:  Josesito Moore is a 21 y.o. female history of ADHD depression presents today as a new patient referral for evaluation of scalp cyst.  Patient has previously had scalp cyst removed by me some time ago.  She states she has 1 area behind the left ear in 1 area and the crown of her head this giving her fits.  Increasing pain.  Increasing size.  These were mobile from surrounding structures.  She desires for surgical excision.  She presents today for evaluation.    Past Medical History:   Diagnosis Date    ADHD (attention deficit hyperactivity disorder)     Depression     Dermoid cyst of scalp     Dizzy     Enlarged heart     Heart murmur of      Obesity      Past Surgical History:   Procedure Laterality Date    Excision of cyst of scalp  2018     Family History   Problem Relation Age of Onset    Heart disease Mother     Heart disease Maternal Aunt     Heart disease Maternal Uncle     Heart disease Maternal Grandmother     Diabetes Maternal Grandmother     Heart disease Maternal Grandfather     Diabetes Paternal Grandfather      Social History     Socioeconomic History    Marital status: Single   Tobacco Use    Smoking status: Never Smoker    Smokeless tobacco: Never Used   Substance and Sexual Activity    Alcohol use: No    Drug use: No    Sexual activity: Not Currently     Partners: Female     Birth control/protection: None       Current Facility-Administered Medications   Medication Dose Route Frequency Provider Last Rate Last Admin    0.9%  NaCl infusion   Intravenous Continuous Jaylen Anderson MD        cefazolin (ANCEF) 2 gram in dextrose 5% 50 mL IVPB (premix)  2 g Intravenous On Call Procedure Jaylen Anderson MD         Review of patient's allergies indicates:  No Known Allergies    Review of Systems  "  Constitutional: Negative for activity change, appetite change, chills and fever.   HENT: Negative for congestion, dental problem and ear discharge.    Eyes: Negative for discharge and itching.   Respiratory: Negative for apnea, choking and chest tightness.    Cardiovascular: Negative for chest pain and leg swelling.   Gastrointestinal: Negative for abdominal distention, abdominal pain, anal bleeding, constipation, diarrhea and nausea.   Endocrine: Negative for cold intolerance and heat intolerance.   Genitourinary: Negative for difficulty urinating and dyspareunia.   Musculoskeletal: Negative for arthralgias and back pain.   Skin: Negative for color change and pallor.   Neurological: Negative for dizziness and facial asymmetry.   Hematological: Negative for adenopathy. Does not bruise/bleed easily.   Psychiatric/Behavioral: Negative for agitation and behavioral problems.       Objective:      Vitals:    03/02/22 0852   BP: 119/69   BP Location: Right arm   Patient Position: Lying   Pulse: 75   Resp: 20   Temp: 98.7 °F (37.1 °C)   TempSrc: Oral   SpO2: 100%   Weight: (!) 136.5 kg (301 lb)   Height: 5' 7" (1.702 m)     Physical Exam  Constitutional:       General: She is not in acute distress.     Appearance: She is well-developed.   HENT:      Head: Normocephalic and atraumatic.     Eyes:      Pupils: Pupils are equal, round, and reactive to light.   Neck:      Thyroid: No thyromegaly.   Cardiovascular:      Rate and Rhythm: Normal rate and regular rhythm.   Pulmonary:      Effort: Pulmonary effort is normal.      Breath sounds: Normal breath sounds.   Abdominal:      General: Bowel sounds are normal. There is no distension.      Palpations: Abdomen is soft.      Tenderness: There is no abdominal tenderness.   Musculoskeletal:         General: No deformity. Normal range of motion.      Cervical back: Normal range of motion and neck supple.   Skin:     General: Skin is warm.      Capillary Refill: Capillary refill " takes less than 2 seconds.      Findings: No erythema.   Neurological:      Mental Status: She is alert and oriented to person, place, and time.      Cranial Nerves: No cranial nerve deficit.   Psychiatric:         Behavior: Behavior normal.          Assessment:       1. Scalp cyst    2. Cyst, dermoid, scalp and neck        Plan:   Scalp cyst  -     Case Request Operating Room: EXCISION, SUPERFICIAL MASS, HEAD  -     Place in Outpatient; Standing  -     Vital signs; Standing  -     Verify beta-blocker dose taken within 24 hours if patient is prescribed beta-blocker; Standing  -     Height and weight; Standing  -     Intake and output; Standing  -     Verify discontinuation of antithrombotics; Standing  -     POCT glucose; Standing  -     Verify blood consent; Standing  -     Verify consent; Standing  -     Diet NPO; Standing  -     Place JOANN hose; Standing  -     Place sequential compression device; Standing    Cyst, dermoid, scalp and neck  -     Case Request Operating Room: EXCISION, SUPERFICIAL MASS, HEAD  -     Place in Outpatient; Standing  -     Vital signs; Standing  -     Verify beta-blocker dose taken within 24 hours if patient is prescribed beta-blocker; Standing  -     Height and weight; Standing  -     Intake and output; Standing  -     Verify discontinuation of antithrombotics; Standing  -     POCT glucose; Standing  -     Verify blood consent; Standing  -     Verify consent; Standing  -     Diet NPO; Standing  -     Place JOANN hose; Standing  -     Place sequential compression device; Standing    Other orders  -     0.9%  NaCl infusion  -     IP VTE LOW RISK PATIENT; Standing  -     cefazolin (ANCEF) 2 gram in dextrose 5% 50 mL IVPB (premix)  -     Pregnancy, urine rapid; Standing  -     COVID-19 Rapid Screening; Standing  -     Notify Anesthesiologist if Patient on Home Insulin Pump; Standing  -     POCT glucose; Standing        Medical Decision Making/Counseling:  Scalp cyst, dermoid cyst.  Will  recommend surgical excision.  Increasing pain, increasing size.  Risk benefits of surgical excision have been discussed in detail with the patient and family in clinic today.  After risk benefits discussion, patient voiced understanding risk benefits wished to proceed near future.    Patient instructed that best way to communicate with my office staff is for patient to get on the [facility] epic patient portal to expedite communication and communication issues that may occur.  Patient was given instructions on how to get on the portal.  I encouraged patient to obtain portal access as well.  Ultimately it is up to the patient to obtain access.  Patient voiced understanding.

## 2022-03-02 NOTE — OP NOTE
[place] - Surgery  Operative Note     SUMMARY     Surgery Date: 3/2/2022     Pre-op Diagnosis:  Cyst, dermoid, scalp and neck [D23.4]  Scalp cyst [L72.9]    Post-op Diagnosis:  Post-Op Diagnosis Codes:     * Cyst, dermoid, scalp and neck [D23.4]     * Scalp cyst [L72.9]    Operation:  1) soft tissue incision 1 cm scalp occipital region.  2) soft tissues decision x2, 1 cm piece left posterior auricular region.    Surgeon(s) and Role:     * Jaylen Anderson MD - Primary    Assistant: None    Antibiotics: Ancef 2 gram IV    Estimated Blood Loss: 5cc    Anesthesia:  General    Description of the findings of the procedure:  3 times scalp soft tissue mass, 1 cm, completely excised, 1 op simple region, 2 posterior left auricular region.    Specimens:  Scalp cyst x1 occipital region.  Scalp cyst x2 posterior auricular region left side.    Complications:  None apparent in the OR.    Implants:  None.         Indications for Procedure:     Ms Moore is a 20yo F presented to clinic for evaluation of scalp soft tissue lesions.  Patient desires for operative excision.  Risk benefits were discussed with the patient in detail.  After informed discussion regarding the risk benefits, patient desires to proceed today with procedural intervention.  Lesions in the scalp were marked in preoperative holding.    Procedure:     Patient is brought back into the operative room placed on table supine position.  Anesthesia was given per the anesthesia team, please see separate dictated anesthesia note for details.  A time-out was conducted with all the operative room in agreement correct patient correct procedure correct allergies correct antibiotics.  Patient was given 2 g Ancef prior surgical incision.  Patient's scalp was prepped and draped in standard sterile surgical fashion.    2 cc 0.25% Marcaine with epinephrine is injected the patient's planned surgical sites.  Of simple region was operated on 1st.  Curvilinear incision was made  around the scalp soft tissue lesion.  Skin incision was carried down to periosteum Bovie electric cautery.  Soft tissue lesion was completely excised.  Skin soft tissue was washed out with  irrigant.  Hemostasis was achieved ensured.  Three 0 Vicryl sutures used in a simple subdermal fashion close the sub dermis.  4-0 Prolene suture was used in a simple interrupted fashion close the epidermis.    Attention was turned towards the left posterior auricular region.  There were 2 soft tissue lesions within this region.  Skin incision was made in a curvilinear fashion, lip to call around the soft tissue lesions.  Skin incision is carried down to subcutaneous tissues Bovie electric cautery.  Both soft tissue lesions were removed in their entirety.  Skin soft tissue was then washed out with  irrigant.  Three 0 Vicryl sutures were used in a simple subdermal fashion close the sub dermis.  4-0 Prolene suture was used in a simple interrupted fashion close the epidermis.    Bacitracin and dry dressings were placed over top both operative fields.    Patient tolerated procedure well she was reversed from anesthetic agent transfer back to postop care unit stable condition.  All counts were correct at the end the procedure including lap pads instruments well as needles.  Plan will be bacitracin placement on the wound daily, dry dressings over top, follow-up surgery clinic 2 weeks for pathology review and suture removal.

## 2022-03-02 NOTE — DISCHARGE INSTRUCTIONS

## 2022-03-02 NOTE — TRANSFER OF CARE
"Anesthesia Transfer of Care Note    Patient: Josesito Moore    Procedure(s) Performed: Procedure(s) (LRB):  EXCISION, SUPERFICIAL MASS, HEAD X 2: LEFT EAR AND CROWN OF HEAD (N/A)    Patient location: PACU    Anesthesia Type: general    Transport from OR: Transported from OR on room air with adequate spontaneous ventilation    Post pain: adequate analgesia    Post assessment: no apparent anesthetic complications and tolerated procedure well    Post vital signs: stable    Level of consciousness: awake, alert and oriented    Nausea/Vomiting: no nausea/vomiting    Complications: none    Transfer of care protocol was followed      Last vitals:   Visit Vitals  /69 (BP Location: Right arm, Patient Position: Lying)   Pulse 75   Temp 37.1 °C (98.7 °F) (Oral)   Resp 20   Ht 5' 7" (1.702 m)   Wt (!) 136.5 kg (301 lb)   SpO2 100%   Breastfeeding No   BMI 47.14 kg/m²     "

## 2022-03-02 NOTE — DISCHARGE SUMMARY
Discharge Note        SUMMARY     Admit Date: 3/2/2022    Attending Physician: Jaylen Anderson MD     Discharge Physician: Jaylen Anderson MD    Discharge Date: 3/2/2022 11:37 AM      Hospital Course: Patient tolerated procedure well.     Disposition: Home or Self Care    Patient Instructions:   Current Discharge Medication List      START taking these medications    Details   ondansetron (ZOFRAN) 4 MG tablet Take 1 tablet (4 mg total) by mouth every 6 (six) hours as needed for Nausea.  Qty: 30 tablet, Refills: 0      oxyCODONE-acetaminophen (PERCOCET) 5-325 mg per tablet Take 1 tablet by mouth every 4 (four) hours as needed for Pain.  Qty: 20 tablet, Refills: 0    Comments: n/a          CONTINUE these medications which have NOT CHANGED    Details   albuterol (PROVENTIL/VENTOLIN HFA) 90 mcg/actuation inhaler Inhale 1-2 puffs into the lungs every 6 (six) hours as needed for Wheezing. Rescue  Qty: 18 g, Refills: 1      amoxicillin-clavulanate 875-125mg (AUGMENTIN) 875-125 mg per tablet Take 1 tablet by mouth 2 (two) times daily.  Qty: 14 tablet, Refills: 0    Associated Diagnoses: Dog bite, initial encounter      azelastine (ASTELIN) 137 mcg (0.1 %) nasal spray 1 spray (137 mcg total) by Nasal route 2 (two) times daily.  Qty: 30 mL, Refills: 3    Associated Diagnoses: COVID-19      ibuprofen (ADVIL,MOTRIN) 800 MG tablet Take 1 tablet (800 mg total) by mouth every 8 (eight) hours as needed for Pain.  Qty: 15 tablet, Refills: 0    Associated Diagnoses: Dog bite, initial encounter             Discharge Procedure Orders (must include Diet, Follow-up, Activity):   Discharge Procedure Orders (must include Diet, Follow-up, Activity)   Diet general     Lifting restrictions   Order Comments: No heavy lifting, pushing, pulling, bending, strenuous exercise greater than 10 lb for the next 6 weeks.     Other restrictions (specify):   Order Comments: No swimming or submersion of wounds in lakes, ponds, streams,  oceans, bathtubs, etc until seen in clinic for postoperative evaluation.     No dressing needed     Call MD for:  temperature >100.4     Call MD for:  persistent nausea and vomiting     Call MD for:  severe uncontrolled pain     Call MD for:  difficulty breathing, headache or visual disturbances     Call MD for:  redness, tenderness, or signs of infection (pain, swelling, redness, odor or green/yellow discharge around incision site)     Call MD for:  persistent dizziness or light-headedness        Follow Up:  Follow up as scheduled.  Resume routine diet.  Activity as tolerated.    No driving day of procedure.

## 2022-03-02 NOTE — PLAN OF CARE
Has met unit/department guidelines for discharge from each phase of the post procedure continuum. Leaving floor per w/c with RN. WILFRED x3. Resp even and unlabored room air. No distress noted.. Incision sites x2 remain approximated without observed problems to site. Remains approximated. Tolerating PO fluids without c/o nausea/vomiting. All personal belongings returned to pt. Instructed pt she may take a pain pill when she feels she needs a dose. V/u.

## 2022-03-02 NOTE — ANESTHESIA PREPROCEDURE EVALUATION
03/02/2022  Josesito Moore is a 21 y.o., female.      Pre-op Assessment    I have reviewed the Patient Summary Reports.     I have reviewed the Nursing Notes.    I have reviewed the Medications.     Review of Systems  Anesthesia Hx:  No problems with previous Anesthesia  Neg history of prior surgery. Denies Family Hx of Anesthesia complications.   Denies Personal Hx of Anesthesia complications.   Social:  Non-Smoker    Hematology/Oncology:  Hematology Normal   Oncology Normal     EENT/Dental:EENT/Dental Normal   Cardiovascular:  Cardiovascular Normal     Pulmonary:  Pulmonary Normal    Renal/:  Renal/ Normal     Hepatic/GI:  Hepatic/GI Normal    Musculoskeletal:  Musculoskeletal Normal    Neurological:  Neurology Normal    Endocrine:  Endocrine Normal    Dermatological:  Skin Normal    Psych:  Psychiatric Normal  ADHD         Physical Exam  General: Alert    Airway:  Mallampati: II   Mouth Opening: Normal  TM Distance: Normal  Neck ROM: Normal ROM    Dental:  Intact        Anesthesia Plan  Type of Anesthesia, risks & benefits discussed:    Anesthesia Type: Gen ETT  Post Op Pain Control Plan: multimodal analgesia  Airway Plan: Direct, Post-Induction  Informed Consent: Patient consented to blood products? No  ASA Score: 2  Day of Surgery Review of History & Physical: H&P Update referred to the surgeon/provider.    Ready For Surgery From Anesthesia Perspective.     .

## 2022-03-03 NOTE — ANESTHESIA POSTPROCEDURE EVALUATION
Anesthesia Post Evaluation    Patient: Josesito Moore    Procedure(s) Performed: Procedure(s) (LRB):  EXCISION, SUPERFICIAL MASS, HEAD X 2: LEFT EAR AND CROWN OF HEAD (N/A)    Final Anesthesia Type: general      Patient location during evaluation: PACU  Patient participation: Yes- Able to Participate  Level of consciousness: awake and awake and alert  Post-procedure vital signs: reviewed and stable  Pain management: adequate  Airway patency: patent    PONV status at discharge: No PONV  Anesthetic complications: no      Cardiovascular status: blood pressure returned to baseline  Respiratory status: unassisted and spontaneous ventilation  Hydration status: euvolemic  Follow-up not needed.          Vitals Value Taken Time   /76 03/02/22 1217   Temp 36.7 °C (98.1 °F) 03/02/22 1215   Pulse 89 03/02/22 1225   Resp 12 03/02/22 1225   SpO2 100 % 03/02/22 1225   Vitals shown include unvalidated device data.      Event Time   Out of Recovery 12:15:00         Pain/Rufino Score: Rufino Score: 9 (3/2/2022 12:00 PM)  Modified Rufino Score: 20 (3/2/2022 12:30 PM)

## 2022-03-04 ENCOUNTER — PATIENT MESSAGE (OUTPATIENT)
Dept: SURGERY | Facility: CLINIC | Age: 22
End: 2022-03-04
Payer: MEDICAID

## 2022-03-15 LAB
FINAL PATHOLOGIC DIAGNOSIS: NORMAL
Lab: NORMAL

## 2022-04-05 ENCOUNTER — TELEPHONE (OUTPATIENT)
Dept: PRIMARY CARE CLINIC | Facility: CLINIC | Age: 22
End: 2022-04-05
Payer: MEDICAID

## 2022-05-31 ENCOUNTER — PATIENT MESSAGE (OUTPATIENT)
Dept: ADMINISTRATIVE | Facility: HOSPITAL | Age: 22
End: 2022-05-31
Payer: MEDICAID

## 2022-10-06 ENCOUNTER — HOSPITAL ENCOUNTER (EMERGENCY)
Facility: HOSPITAL | Age: 22
Discharge: HOME | End: 2022-10-07
Attending: EMERGENCY MEDICINE
Payer: MEDICAID

## 2022-10-06 DIAGNOSIS — G43.909 MIGRAINE WITHOUT STATUS MIGRAINOSUS, NOT INTRACTABLE, UNSPECIFIED MIGRAINE TYPE: Primary | ICD-10-CM

## 2022-10-06 PROCEDURE — 25000003 PHARM REV CODE 250: Performed by: EMERGENCY MEDICINE

## 2022-10-06 PROCEDURE — 99285 EMERGENCY DEPT VISIT HI MDM: CPT

## 2022-10-06 PROCEDURE — 63600175 PHARM REV CODE 636 W HCPCS: Performed by: EMERGENCY MEDICINE

## 2022-10-06 PROCEDURE — 96372 THER/PROPH/DIAG INJ SC/IM: CPT | Performed by: EMERGENCY MEDICINE

## 2022-10-06 RX ORDER — HYDROCODONE BITARTRATE AND ACETAMINOPHEN 10; 325 MG/1; MG/1
1 TABLET ORAL
Status: COMPLETED | OUTPATIENT
Start: 2022-10-06 | End: 2022-10-06

## 2022-10-06 RX ORDER — KETOROLAC TROMETHAMINE 30 MG/ML
60 INJECTION, SOLUTION INTRAMUSCULAR; INTRAVENOUS
Status: COMPLETED | OUTPATIENT
Start: 2022-10-06 | End: 2022-10-06

## 2022-10-06 RX ORDER — ONDANSETRON 4 MG/1
4 TABLET, ORALLY DISINTEGRATING ORAL
Status: COMPLETED | OUTPATIENT
Start: 2022-10-06 | End: 2022-10-06

## 2022-10-06 RX ADMIN — KETOROLAC TROMETHAMINE 60 MG: 30 INJECTION, SOLUTION INTRAMUSCULAR; INTRAVENOUS at 11:10

## 2022-10-06 RX ADMIN — ONDANSETRON 4 MG: 4 TABLET, ORALLY DISINTEGRATING ORAL at 11:10

## 2022-10-06 RX ADMIN — HYDROCODONE BITARTRATE AND ACETAMINOPHEN 1 TABLET: 10; 325 TABLET ORAL at 11:10

## 2022-10-06 NOTE — Clinical Note
"Josesito"Jay Moore was seen and treated in our emergency department on 10/6/2022.  She may return to work on 10/10/2022.       If you have any questions or concerns, please don't hesitate to call.      BETHANIE Saxena RN RN    "

## 2022-10-07 VITALS
HEIGHT: 67 IN | SYSTOLIC BLOOD PRESSURE: 114 MMHG | TEMPERATURE: 99 F | OXYGEN SATURATION: 100 % | HEART RATE: 59 BPM | BODY MASS INDEX: 45.04 KG/M2 | DIASTOLIC BLOOD PRESSURE: 72 MMHG | RESPIRATION RATE: 18 BRPM | WEIGHT: 287 LBS

## 2022-10-07 PROCEDURE — 63600175 PHARM REV CODE 636 W HCPCS: Performed by: EMERGENCY MEDICINE

## 2022-10-07 PROCEDURE — 96372 THER/PROPH/DIAG INJ SC/IM: CPT | Performed by: EMERGENCY MEDICINE

## 2022-10-07 RX ORDER — MORPHINE SULFATE 4 MG/ML
8 INJECTION, SOLUTION INTRAMUSCULAR; INTRAVENOUS
Status: COMPLETED | OUTPATIENT
Start: 2022-10-07 | End: 2022-10-07

## 2022-10-07 RX ORDER — BUTALBITAL, ACETAMINOPHEN AND CAFFEINE 50; 325; 40 MG/1; MG/1; MG/1
2 TABLET ORAL EVERY 6 HOURS PRN
Qty: 20 TABLET | Refills: 0 | Status: SHIPPED | OUTPATIENT
Start: 2022-10-07 | End: 2022-11-06

## 2022-10-07 RX ADMIN — MORPHINE SULFATE 8 MG: 4 INJECTION, SOLUTION INTRAMUSCULAR; INTRAVENOUS at 12:10

## 2022-10-25 ENCOUNTER — PROCEDURE VISIT (OUTPATIENT)
Dept: SLEEP MEDICINE | Facility: HOSPITAL | Age: 22
End: 2022-10-25
Attending: INTERNAL MEDICINE
Payer: MEDICAID

## 2022-10-25 DIAGNOSIS — G47.33 OSA (OBSTRUCTIVE SLEEP APNEA): ICD-10-CM

## 2022-10-25 PROCEDURE — 95811 POLYSOM 6/>YRS CPAP 4/> PARM: CPT

## 2022-11-13 ENCOUNTER — PATIENT MESSAGE (OUTPATIENT)
Dept: FAMILY MEDICINE | Facility: CLINIC | Age: 22
End: 2022-11-13
Payer: MEDICAID

## 2023-02-20 ENCOUNTER — HOSPITAL ENCOUNTER (EMERGENCY)
Facility: HOSPITAL | Age: 23
Discharge: HOME | End: 2023-02-21
Attending: EMERGENCY MEDICINE
Payer: MEDICAID

## 2023-02-20 VITALS
WEIGHT: 286 LBS | OXYGEN SATURATION: 97 % | TEMPERATURE: 99 F | RESPIRATION RATE: 20 BRPM | HEART RATE: 106 BPM | HEIGHT: 67 IN | DIASTOLIC BLOOD PRESSURE: 98 MMHG | BODY MASS INDEX: 44.89 KG/M2 | SYSTOLIC BLOOD PRESSURE: 138 MMHG

## 2023-02-20 DIAGNOSIS — R07.9 CHEST PAIN: ICD-10-CM

## 2023-02-20 DIAGNOSIS — K20.90 ESOPHAGITIS: ICD-10-CM

## 2023-02-20 DIAGNOSIS — K52.9 GASTROENTERITIS: Primary | ICD-10-CM

## 2023-02-20 DIAGNOSIS — R00.2 PALPITATIONS: ICD-10-CM

## 2023-02-20 LAB
ALBUMIN SERPL BCP-MCNC: 4.3 G/DL (ref 3.5–5.2)
ALP SERPL-CCNC: 69 U/L (ref 55–135)
ALT SERPL W/O P-5'-P-CCNC: 22 U/L (ref 10–44)
AMPHET+METHAMPHET UR QL: NEGATIVE
ANION GAP SERPL CALC-SCNC: 10 MMOL/L (ref 8–16)
AST SERPL-CCNC: 27 U/L (ref 10–40)
B-HCG UR QL: NEGATIVE
BARBITURATES UR QL SCN>200 NG/ML: NEGATIVE
BASOPHILS # BLD AUTO: 0.02 K/UL (ref 0–0.2)
BASOPHILS NFR BLD: 0.2 % (ref 0–1.9)
BENZODIAZ UR QL SCN>200 NG/ML: NEGATIVE
BILIRUB SERPL-MCNC: 0.2 MG/DL (ref 0.1–1)
BILIRUB UR QL STRIP: NEGATIVE
BUN SERPL-MCNC: 13 MG/DL (ref 6–20)
BZE UR QL SCN: NEGATIVE
CALCIUM SERPL-MCNC: 9.7 MG/DL (ref 8.7–10.5)
CANNABINOIDS UR QL SCN: NEGATIVE
CHLORIDE SERPL-SCNC: 106 MMOL/L (ref 95–110)
CLARITY UR: CLEAR
CO2 SERPL-SCNC: 23 MMOL/L (ref 23–29)
COLOR UR: YELLOW
CREAT SERPL-MCNC: 0.8 MG/DL (ref 0.5–1.4)
CREAT UR-MCNC: 171.1 MG/DL (ref 15–325)
DIFFERENTIAL METHOD: ABNORMAL
EOSINOPHIL # BLD AUTO: 0 K/UL (ref 0–0.5)
EOSINOPHIL NFR BLD: 0 % (ref 0–8)
ERYTHROCYTE [DISTWIDTH] IN BLOOD BY AUTOMATED COUNT: 12.2 % (ref 11.5–14.5)
EST. GFR  (NO RACE VARIABLE): >60 ML/MIN/1.73 M^2
GLUCOSE SERPL-MCNC: 119 MG/DL (ref 70–110)
GLUCOSE UR QL STRIP: NEGATIVE
HCT VFR BLD AUTO: 45.2 % (ref 37–48.5)
HGB BLD-MCNC: 15.7 G/DL (ref 12–16)
HGB UR QL STRIP: NEGATIVE
IMM GRANULOCYTES # BLD AUTO: 0.02 K/UL (ref 0–0.04)
IMM GRANULOCYTES NFR BLD AUTO: 0.2 % (ref 0–0.5)
KETONES UR QL STRIP: NEGATIVE
LEUKOCYTE ESTERASE UR QL STRIP: NEGATIVE
LYMPHOCYTES # BLD AUTO: 0.8 K/UL (ref 1–4.8)
LYMPHOCYTES NFR BLD: 9.7 % (ref 18–48)
MCH RBC QN AUTO: 29.5 PG (ref 27–31)
MCHC RBC AUTO-ENTMCNC: 34.7 G/DL (ref 32–36)
MCV RBC AUTO: 85 FL (ref 82–98)
METHADONE UR QL SCN>300 NG/ML: NEGATIVE
MONOCYTES # BLD AUTO: 0.2 K/UL (ref 0.3–1)
MONOCYTES NFR BLD: 2.6 % (ref 4–15)
NEUTROPHILS # BLD AUTO: 7.4 K/UL (ref 1.8–7.7)
NEUTROPHILS NFR BLD: 87.3 % (ref 38–73)
NITRITE UR QL STRIP: NEGATIVE
NRBC BLD-RTO: 0 /100 WBC
OPIATES UR QL SCN: NEGATIVE
PCP UR QL SCN>25 NG/ML: NEGATIVE
PH UR STRIP: 6 [PH] (ref 5–8)
PLATELET # BLD AUTO: 281 K/UL (ref 150–450)
PMV BLD AUTO: 9.9 FL (ref 9.2–12.9)
POTASSIUM SERPL-SCNC: 4.4 MMOL/L (ref 3.5–5.1)
PROT SERPL-MCNC: 8.5 G/DL (ref 6–8.4)
PROT UR QL STRIP: NEGATIVE
RBC # BLD AUTO: 5.32 M/UL (ref 4–5.4)
SODIUM SERPL-SCNC: 139 MMOL/L (ref 136–145)
SP GR UR STRIP: 1.02 (ref 1–1.03)
TOXICOLOGY INFORMATION: NORMAL
URN SPEC COLLECT METH UR: NORMAL
UROBILINOGEN UR STRIP-ACNC: 1 EU/DL
WBC # BLD AUTO: 8.48 K/UL (ref 3.9–12.7)

## 2023-02-20 PROCEDURE — 80307 DRUG TEST PRSMV CHEM ANLYZR: CPT | Performed by: EMERGENCY MEDICINE

## 2023-02-20 PROCEDURE — 71046 XR CHEST PA AND LATERAL: ICD-10-PCS | Mod: 26,,, | Performed by: RADIOLOGY

## 2023-02-20 PROCEDURE — 93010 ELECTROCARDIOGRAM REPORT: CPT | Mod: ,,, | Performed by: INTERNAL MEDICINE

## 2023-02-20 PROCEDURE — 81025 URINE PREGNANCY TEST: CPT | Performed by: EMERGENCY MEDICINE

## 2023-02-20 PROCEDURE — 25000003 PHARM REV CODE 250: Performed by: EMERGENCY MEDICINE

## 2023-02-20 PROCEDURE — 93005 ELECTROCARDIOGRAM TRACING: CPT

## 2023-02-20 PROCEDURE — 96360 HYDRATION IV INFUSION INIT: CPT

## 2023-02-20 PROCEDURE — 85025 COMPLETE CBC W/AUTO DIFF WBC: CPT | Performed by: EMERGENCY MEDICINE

## 2023-02-20 PROCEDURE — 93010 EKG 12-LEAD: ICD-10-PCS | Mod: ,,, | Performed by: INTERNAL MEDICINE

## 2023-02-20 PROCEDURE — 80053 COMPREHEN METABOLIC PANEL: CPT | Performed by: EMERGENCY MEDICINE

## 2023-02-20 PROCEDURE — 81003 URINALYSIS AUTO W/O SCOPE: CPT | Mod: 59 | Performed by: EMERGENCY MEDICINE

## 2023-02-20 PROCEDURE — 71046 X-RAY EXAM CHEST 2 VIEWS: CPT | Mod: 26,,, | Performed by: RADIOLOGY

## 2023-02-20 PROCEDURE — 86803 HEPATITIS C AB TEST: CPT | Performed by: EMERGENCY MEDICINE

## 2023-02-20 PROCEDURE — 71046 X-RAY EXAM CHEST 2 VIEWS: CPT | Mod: TC

## 2023-02-20 PROCEDURE — 87389 HIV-1 AG W/HIV-1&-2 AB AG IA: CPT | Performed by: EMERGENCY MEDICINE

## 2023-02-20 PROCEDURE — 99285 EMERGENCY DEPT VISIT HI MDM: CPT | Mod: 25

## 2023-02-20 RX ORDER — DIPHENOXYLATE HYDROCHLORIDE AND ATROPINE SULFATE 2.5; .025 MG/1; MG/1
1 TABLET ORAL 4 TIMES DAILY PRN
Qty: 30 TABLET | Refills: 0 | Status: SHIPPED | OUTPATIENT
Start: 2023-02-20 | End: 2023-03-02

## 2023-02-20 RX ORDER — OMEPRAZOLE 40 MG/1
40 CAPSULE, DELAYED RELEASE ORAL DAILY
Qty: 30 CAPSULE | Refills: 0 | Status: SHIPPED | OUTPATIENT
Start: 2023-02-20 | End: 2023-09-27

## 2023-02-20 RX ORDER — METHYLPREDNISOLONE 4 MG/1
4 TABLET ORAL
COMMUNITY

## 2023-02-20 RX ORDER — ONDANSETRON 4 MG/1
4 TABLET, ORALLY DISINTEGRATING ORAL EVERY 6 HOURS PRN
Qty: 20 TABLET | Refills: 0 | Status: SHIPPED | OUTPATIENT
Start: 2023-02-20 | End: 2023-09-27

## 2023-02-20 RX ADMIN — SODIUM CHLORIDE 1000 ML: 9 INJECTION, SOLUTION INTRAVENOUS at 10:02

## 2023-02-21 LAB
HCV AB SERPL QL IA: NORMAL
HIV 1+2 AB+HIV1 P24 AG SERPL QL IA: NORMAL

## 2023-02-21 NOTE — ED PROVIDER NOTES
Encounter Date: 2023       History     Chief Complaint   Patient presents with    Diarrhea     Diarrhea & vomiting x 1 day. Palpitations, shortness of breath and dizzy onset today. Epigastric pain     Pt here with c/o NVD since yesterday now with some palpitations and chest pain since starting prednisone for URI. No SOB. No recent abx. Not currently having CP. Main complaint is her diarrhea and palpitations. No drug use and no recent vax.    The history is provided by the patient.   Diarrhea   This is a new problem. The current episode started yesterday. The problem occurs 2 to 4 times per day. The problem has been unchanged. The stool consistency is described as Watery. The patient states that diarrhea does not awaken her from sleep. Associated symptoms include a URI and vomiting. Pertinent negatives include no abdominal pain, arthralgias, bloating, chills, coughing, fever, headaches, increased  flatus, myalgias, sweats or weight loss. Nothing aggravates the symptoms. There are no known risk factors. She has tried nothing for the symptoms.   Review of patient's allergies indicates:  No Known Allergies  Past Medical History:   Diagnosis Date    ADHD (attention deficit hyperactivity disorder)     Depression     Dermoid cyst of scalp     Dizzy     Enlarged heart     Heart murmur of      Obesity      Past Surgical History:   Procedure Laterality Date    Excision of cyst of scalp  2018     Family History   Problem Relation Age of Onset    Heart disease Mother     Heart disease Maternal Aunt     Heart disease Maternal Uncle     Heart disease Maternal Grandmother     Diabetes Maternal Grandmother     Heart disease Maternal Grandfather     Diabetes Paternal Grandfather      Social History     Tobacco Use    Smoking status: Never    Smokeless tobacco: Never   Substance Use Topics    Alcohol use: No    Drug use: No     Review of Systems   Constitutional:  Negative for chills, fever and weight loss.    Respiratory:  Negative for cough.    Cardiovascular:  Positive for chest pain and palpitations.   Gastrointestinal:  Positive for diarrhea, nausea and vomiting. Negative for abdominal pain, bloating and flatus.   Musculoskeletal:  Negative for arthralgias and myalgias.   Neurological:  Negative for headaches.   All other systems reviewed and are negative.    Physical Exam     Initial Vitals [02/20/23 2157]   BP Pulse Resp Temp SpO2   (!) 138/98 106 20 98.6 °F (37 °C) 97 %      MAP       --         Physical Exam    Nursing note and vitals reviewed.  Constitutional:   Obese WF in nad.    HENT:   Mouth/Throat: Oropharynx is clear and moist.   Eyes: No scleral icterus.   Neck: Neck supple. No thyromegaly present.   Normal range of motion.  Cardiovascular:  Regular rhythm, normal heart sounds and intact distal pulses.           tachy   Pulmonary/Chest: Breath sounds normal. She exhibits no tenderness.   Abdominal: Abdomen is soft. Bowel sounds are normal. There is no abdominal tenderness.   Musculoskeletal:         General: No tenderness or edema. Normal range of motion.      Cervical back: Normal range of motion and neck supple.     Neurological: She is alert and oriented to person, place, and time. GCS score is 15. GCS eye subscore is 4. GCS verbal subscore is 5. GCS motor subscore is 6.   Skin: Skin is warm and dry. Capillary refill takes less than 2 seconds. No rash noted. No erythema. No pallor.       ED Course   Procedures  Labs Reviewed   CBC W/ AUTO DIFFERENTIAL - Abnormal; Notable for the following components:       Result Value    Lymph # 0.8 (*)     Mono # 0.2 (*)     Gran % 87.3 (*)     Lymph % 9.7 (*)     Mono % 2.6 (*)     All other components within normal limits    Narrative:     Release to patient->Immediate   COMPREHENSIVE METABOLIC PANEL - Abnormal; Notable for the following components:    Glucose 119 (*)     Total Protein 8.5 (*)     All other components within normal limits    Narrative:      Release to patient->Immediate   URINALYSIS, REFLEX TO URINE CULTURE    Narrative:     Preferred Collection Type->Urine, Clean Catch  Specimen Source->Urine   DRUG SCREEN PANEL, URINE EMERGENCY    Narrative:     Preferred Collection Type->Urine, Clean Catch  Specimen Source->Urine   HIV 1 / 2 ANTIBODY   HEPATITIS C ANTIBODY   PREGNANCY TEST, URINE RAPID     EKG Readings: (Independently Interpreted)   Rhythm: Normal Sinus Rhythm. Ectopy: No Ectopy. Conduction: Normal. ST Segments: Normal ST Segments. T Waves: Normal. Axis: Normal. Clinical Impression: Normal Sinus Rhythm     Imaging Results              X-Ray Chest PA And Lateral (In process)                      Medications   sodium chloride 0.9% bolus 1,000 mL 1,000 mL (1,000 mLs Intravenous New Bag 2/20/23 2221)     Medical Decision Making:   Differential Diagnosis:   GERD, medication side effect, viral GI illness, dehydration  Clinical Tests:   Lab Tests: Ordered and Reviewed  Radiological Study: Ordered and Reviewed  Medical Tests: Ordered and Reviewed  ED Management:  Pt presented with NVD and chest pain with palpitations. Benign exam. I suspect viral GI illness with esophagitis causing her chest pain and the prednisone causing her palpitations. Normal EKG here. Neg CXR. CBC, CMP, UA, UDS and UPT unremarkable. Pt given 1L NS. Rx zofran and lomotil. Return precautions discussed.           ED Course as of 02/20/23 2323   Mon Feb 20, 2023 2247 CXR nap my read [DC]      ED Course User Index  [DC] Petey Mcfarlane Jr., MD                 Clinical Impression:   Final diagnoses:  [R07.9] Chest pain  [K52.9] Gastroenteritis (Primary)  [R00.2] Palpitations  [K20.90] Esophagitis        ED Disposition Condition    Discharge Stable          ED Prescriptions       Medication Sig Dispense Start Date End Date Auth. Provider    ondansetron (ZOFRAN-ODT) 4 MG TbDL Take 1 tablet (4 mg total) by mouth every 6 (six) hours as needed (nausea). 20 tablet 2/20/2023 -- Petey Mcfarlane  MD [facility]    diphenoxylate-atropine 2.5-0.025 mg (LOMOTIL) 2.5-0.025 mg per tablet Take 1 tablet by mouth 4 (four) times daily as needed for Diarrhea. 30 tablet 2/20/2023 3/2/2023 Petey Mcfarlane Jr., MD    omeprazole (PRILOSEC) 40 MG capsule Take 1 capsule (40 mg total) by mouth once daily. 30 capsule 2/20/2023 -- Petey Mcfarlane Jr., MD          Follow-up Information       Follow up With Specialties Details Why Contact Info    Leilani Buenrostro MD Family Medicine Schedule an appointment as soon as possible for a visit   [address] [facility] [place] [address] [phone]               Petey Mcfarlane Jr., MD  02/20/23 5472

## 2023-03-16 ENCOUNTER — TELEPHONE (OUTPATIENT)
Dept: FAMILY MEDICINE | Facility: CLINIC | Age: 23
End: 2023-03-16
Payer: MEDICAID

## 2023-03-16 NOTE — TELEPHONE ENCOUNTER
----- Message from Suzette Gaines sent at 3/16/2023  1:06 PM CDT -----  Contact: jessica with [facility] at [phone]  Type: Needs Medical Advice  Who Called:  jessica with [facility]  Best Call Back Number: [phone] Fax #[phone]  Additional Information: [facility] is requesting an order for a cpap and would like the medical records for the patient. Please call back and advise. Thank you

## 2023-04-21 ENCOUNTER — HOSPITAL ENCOUNTER (EMERGENCY)
Facility: HOSPITAL | Age: 23
Discharge: HOME | End: 2023-04-21
Attending: EMERGENCY MEDICINE
Payer: MEDICAID

## 2023-04-21 VITALS
RESPIRATION RATE: 18 BRPM | BODY MASS INDEX: 45.99 KG/M2 | TEMPERATURE: 98 F | HEART RATE: 78 BPM | HEIGHT: 67 IN | OXYGEN SATURATION: 99 % | DIASTOLIC BLOOD PRESSURE: 89 MMHG | SYSTOLIC BLOOD PRESSURE: 105 MMHG | WEIGHT: 293 LBS

## 2023-04-21 DIAGNOSIS — G43.909 MIGRAINE WITHOUT STATUS MIGRAINOSUS, NOT INTRACTABLE, UNSPECIFIED MIGRAINE TYPE: Primary | ICD-10-CM

## 2023-04-21 PROCEDURE — 96372 THER/PROPH/DIAG INJ SC/IM: CPT | Performed by: EMERGENCY MEDICINE

## 2023-04-21 PROCEDURE — 63600175 PHARM REV CODE 636 W HCPCS: Performed by: EMERGENCY MEDICINE

## 2023-04-21 PROCEDURE — 99284 EMERGENCY DEPT VISIT MOD MDM: CPT

## 2023-04-21 PROCEDURE — 25000003 PHARM REV CODE 250: Performed by: EMERGENCY MEDICINE

## 2023-04-21 RX ORDER — BUTALBITAL, ACETAMINOPHEN AND CAFFEINE 50; 325; 40 MG/1; MG/1; MG/1
1-2 TABLET ORAL EVERY 6 HOURS PRN
Qty: 30 TABLET | Refills: 0 | Status: SHIPPED | OUTPATIENT
Start: 2023-04-21 | End: 2023-09-27

## 2023-04-21 RX ORDER — BUTALBITAL, ACETAMINOPHEN AND CAFFEINE 50; 325; 40 MG/1; MG/1; MG/1
2 TABLET ORAL
Status: COMPLETED | OUTPATIENT
Start: 2023-04-21 | End: 2023-04-21

## 2023-04-21 RX ORDER — HYDROMORPHONE HYDROCHLORIDE 1 MG/ML
1 INJECTION, SOLUTION INTRAMUSCULAR; INTRAVENOUS; SUBCUTANEOUS
Status: COMPLETED | OUTPATIENT
Start: 2023-04-21 | End: 2023-04-21

## 2023-04-21 RX ORDER — ONDANSETRON 4 MG/1
4 TABLET, ORALLY DISINTEGRATING ORAL
Status: COMPLETED | OUTPATIENT
Start: 2023-04-21 | End: 2023-04-21

## 2023-04-21 RX ADMIN — HYDROMORPHONE HYDROCHLORIDE 1 MG: 1 INJECTION, SOLUTION INTRAMUSCULAR; INTRAVENOUS; SUBCUTANEOUS at 02:04

## 2023-04-21 RX ADMIN — ONDANSETRON 4 MG: 4 TABLET, ORALLY DISINTEGRATING ORAL at 02:04

## 2023-04-21 RX ADMIN — BUTALBITAL, ACETAMINOPHEN, AND CAFFEINE 2 TABLET: 50; 325; 40 TABLET ORAL at 02:04

## 2023-04-21 NOTE — ED PROVIDER NOTES
Encounter Date: 2023       History     Chief Complaint   Patient presents with    Headache     Headache last 2-3 hrs; took 1,000 mg Tylenol at home     Pt with hx of migraine here with HA onset 2hrs ago. Onset gradual and now severe. C/w previous HA. Tylenol not helping.     The history is provided by the patient.   Headache   This is a recurrent problem. The current episode started today. The problem occurs constantly. The problem has been unchanged. The quality of the pain is described as throbbing and aching. The pain is at a severity of 8/10. Associated symptoms include dizziness, nausea, phonophobia and photophobia. Pertinent negatives include no abdominal pain, abnormal behavior, anorexia, back pain, blurred vision, coughing, drainage, ear pain, eye pain, eye redness, eye watering, facial sweating, fever, hearing loss, insomnia, loss of balance, muscle aches, neck pain, numbness, rhinorrhea, scalp tenderness, seizures, sinus pressure, sore throat, swollen glands, tingling, tinnitus, visual change, vomiting, weakness or weight loss. The symptoms are aggravated by bright light and noise. She has tried acetaminophen for the symptoms. The treatment provided no relief. Her past medical history is significant for migraine headaches.   Review of patient's allergies indicates:  No Known Allergies  Past Medical History:   Diagnosis Date    ADHD (attention deficit hyperactivity disorder)     Depression     Dermoid cyst of scalp     Dizzy     Enlarged heart     Heart murmur of      Obesity      Past Surgical History:   Procedure Laterality Date    Excision of cyst of scalp  2018     Family History   Problem Relation Age of Onset    Heart disease Mother     Heart disease Maternal Aunt     Heart disease Maternal Uncle     Heart disease Maternal Grandmother     Diabetes Maternal Grandmother     Heart disease Maternal Grandfather     Diabetes Paternal Grandfather      Social History     Tobacco Use     Smoking status: Never    Smokeless tobacco: Never   Substance Use Topics    Alcohol use: No    Drug use: No     Review of Systems   Constitutional:  Negative for fever and weight loss.   HENT:  Negative for ear pain, hearing loss, rhinorrhea, sinus pressure, sore throat and tinnitus.    Eyes:  Positive for photophobia. Negative for blurred vision, pain and redness.   Respiratory:  Negative for cough.    Gastrointestinal:  Positive for nausea. Negative for abdominal pain, anorexia and vomiting.   Musculoskeletal:  Negative for back pain and neck pain.   Neurological:  Positive for dizziness and headaches. Negative for tingling, seizures, weakness, numbness and loss of balance.   Psychiatric/Behavioral:  The patient does not have insomnia.    All other systems reviewed and are negative.    Physical Exam     Initial Vitals   BP Pulse Resp Temp SpO2   -- -- -- -- --      MAP       --         Physical Exam    Nursing note and vitals reviewed.  Constitutional: She appears well-developed and well-nourished. She is not diaphoretic. No distress.   HENT:   Head: Normocephalic and atraumatic.   Eyes: Conjunctivae and EOM are normal. Pupils are equal, round, and reactive to light. No scleral icterus.   Neck: Neck supple.   Normal range of motion.  Cardiovascular:  Normal rate, regular rhythm, normal heart sounds and intact distal pulses.           Pulmonary/Chest: Breath sounds normal.   Abdominal: Abdomen is soft. There is no abdominal tenderness.   Musculoskeletal:         General: No tenderness or edema. Normal range of motion.      Cervical back: Normal range of motion and neck supple.     Neurological: She is alert and oriented to person, place, and time. She has normal strength. No cranial nerve deficit or sensory deficit. GCS score is 15. GCS eye subscore is 4. GCS verbal subscore is 5. GCS motor subscore is 6.   Skin: Skin is warm. Capillary refill takes less than 2 seconds. No rash noted. No erythema. No pallor.    Psychiatric: She has a normal mood and affect.       ED Course   Procedures  Labs Reviewed - No data to display       Imaging Results    None          Medications   HYDROmorphone injection 1 mg (has no administration in time range)   ondansetron disintegrating tablet 4 mg (has no administration in time range)   butalbital-acetaminophen-caffeine -40 mg per tablet 2 tablet (has no administration in time range)                              Clinical Impression:   Final diagnoses:  [G43.909] Migraine without status migrainosus, not intractable, unspecified migraine type (Primary)        ED Disposition Condition    Discharge Stable          ED Prescriptions       Medication Sig Dispense Start Date End Date Auth. Provider    butalbital-acetaminophen-caffeine -40 mg (FIORICET, ESGIC) -40 mg per tablet Take 1-2 tablets by mouth every 6 (six) hours as needed for Pain. 30 tablet 4/21/2023 -- Petey Mcfarlane Jr., MD          Follow-up Information       Follow up With Specialties Details Why Contact Info    Leilani Buenrostro MD Family Medicine Schedule an appointment as soon as possible for a visit   [address] [facility] [place] [address]  [phone]               Petey Mcfarlane Jr., MD  04/21/23 1839

## 2023-04-21 NOTE — Clinical Note
Aarti Rodgers accompanied their family member to the emergency department on 4/21/2023. They may return to work on 04/21/2023.      If you have any questions or concerns, please don't hesitate to call.      Tyra Doshi RN

## 2023-07-13 ENCOUNTER — PATIENT MESSAGE (OUTPATIENT)
Dept: ADMINISTRATIVE | Facility: HOSPITAL | Age: 23
End: 2023-07-13
Payer: MEDICAID

## 2023-07-13 ENCOUNTER — PATIENT OUTREACH (OUTPATIENT)
Dept: ADMINISTRATIVE | Facility: HOSPITAL | Age: 23
End: 2023-07-13
Payer: MEDICAID

## 2023-07-13 ENCOUNTER — TELEPHONE (OUTPATIENT)
Dept: FAMILY MEDICINE | Facility: CLINIC | Age: 23
End: 2023-07-13
Payer: MEDICAID

## 2023-07-13 NOTE — TELEPHONE ENCOUNTER
----- Message from Blair Dunn sent at 7/13/2023 12:56 PM CDT -----  Type:  Patient Returning Call    Who Called:  Patient  Who Left Message for Patient:  Vanesa   Does the patient know what this is regarding?:  Appointment-Sleep apnea  Best Call Back Number:  [phone]  Additional Information:

## 2023-07-13 NOTE — PROGRESS NOTES
Population Health Chart Review & Patient Outreach Details:     Reason for Outreach Encounter:     [x]  Non-Compliant Report   []  Payor Report ([employer], PHN, BCBS, MSSP, MCIP, UHC, etc.)   []  Pre-Visit Chart Review     Updates Requested / Reviewed:     [x]  Care Everywhere    []     [x]  External Sources ([facility], [facility], DIS, etc.)   [x]  Care Team Updated    Patient Outreach Method:    []  Telephone Outreach Completed   [] Successful   [] Left Voicemail   [] Unable to Contact (wrong number, no voicemail)  [x]  [facility] Outreach Sent  []  Letter Outreach Mailed  []  Fax Sent for External Records  []  External Records Upload    Health Maintenance Topics Addressed and Outreach Outcomes / Actions Taken:        []      Breast Cancer Screening []  Mammo Scheduled      []  External Records Requested     []  Added Reminder to Complete to Upcoming Primary Care Appt Notes     []  Patient Declined     []  Patient Will Call Back to Schedule     []  Patient Will Schedule with External Provider / Order Routed if Applicable             []       Cervical Cancer Screening []  Pap Scheduled      []  External Records Requested     []  Added Reminder to Complete to Upcoming Primary Care Appt Notes     []  Patient Declined     []  Patient Will Call Back to Schedule     []  Patient Will Schedule with External Provider               []          Colorectal Cancer Screening []  Colonoscopy Case Request or Referral Placed     []  External Records Requested     []  Added Reminder to Complete to Upcoming Primary Care Appt Notes     []  Patient Declined     []  Patient Will Call Back to Schedule     []  Patient Will Schedule with External Provider     []  Fit Kit Mailed (add the SmartPhrase under additional notes)     []  Reminded Patient to Complete Home Test             []      Diabetic Eye Exam []  Eye Camera Scheduled or Optometry Referral Placed     []  External Records Requested     []  Added Reminder to Complete to  Upcoming Primary Care Appt Notes     []  Patient Declined     []  Patient Will Call Back to Schedule     []  Patient Will Schedule with External Provider             []      Blood Pressure Control []  Primary Care Follow Up Visit Scheduled     []  Remote Blood Pressure Reading Captured     []  Added Reminder to Complete to Upcoming Primary Care Appt Notes     []  Patient Declined     []  Patient Will Call Back / Patient Will Send Portal Message with Reading     []  Patient Will Call Back to Schedule Provider Visit             []       HbA1c & Other Labs []  Lab Appt Scheduled for Due Labs     []  Primary Care Follow Up Visit Scheduled      []  Reminded Patient to Complete Home Test     []  Added Reminder to Complete to Upcoming Primary Care Appt Notes     []  Patient Declined     []  Patient Will Call Back to Schedule     []  Patient Will Schedule with External Provider / Order Routed if Applicable           []    Schedule Primary Care Appt []  Primary Care Appt Scheduled     []  Patient Declined     []  Patient Will Call Back to Schedule     []  Pt Established with External Provider & Updated Care Team             []      Medication Adherence []  Primary Care Appointment Scheduled     []  Added Reminder to Upcoming Primary Care Appt Notes     []  Patient Reminded to  Prescription     []  Patient Declined, Provider Notified if Needed     []  Sent Provider Message to Review and/or Add Exclusion to Problem List             []      Osteoporosis Screening []  DXA Appointment Scheduled     []  External Records Requested     []  Added Reminder to Complete to Upcoming Primary Care Appt Notes     []  Patient Declined     []  Patient Will Call Back to Schedule     []  Patient Will Schedule with External Provider / Order Routed if Applicable     Additional Care Coordinator Notes:     Pt is aware that Dr Buenrostro is no longer practicing at [facility]. Has scheduled two different appointments with two different PCP and  cancelled, sending portal message and updating care team.    Further Action Needed If Patient Returns Outreach:

## 2023-09-27 ENCOUNTER — OFFICE VISIT (OUTPATIENT)
Dept: FAMILY MEDICINE | Facility: CLINIC | Age: 23
End: 2023-09-27
Payer: MEDICAID

## 2023-09-27 ENCOUNTER — LAB VISIT (OUTPATIENT)
Dept: LAB | Facility: CLINIC | Age: 23
End: 2023-09-27
Payer: MEDICAID

## 2023-09-27 VITALS
SYSTOLIC BLOOD PRESSURE: 100 MMHG | WEIGHT: 293 LBS | HEART RATE: 62 BPM | BODY MASS INDEX: 45.99 KG/M2 | TEMPERATURE: 98 F | OXYGEN SATURATION: 97 % | DIASTOLIC BLOOD PRESSURE: 80 MMHG | HEIGHT: 67 IN

## 2023-09-27 DIAGNOSIS — G47.33 OSA (OBSTRUCTIVE SLEEP APNEA): ICD-10-CM

## 2023-09-27 DIAGNOSIS — Z79.899 PHARMACOLOGIC THERAPY: ICD-10-CM

## 2023-09-27 DIAGNOSIS — N92.1 METRORRHAGIA: ICD-10-CM

## 2023-09-27 DIAGNOSIS — E66.01 MORBID (SEVERE) OBESITY DUE TO EXCESS CALORIES: Primary | ICD-10-CM

## 2023-09-27 DIAGNOSIS — Z13.220 ENCOUNTER FOR SCREENING FOR LIPID DISORDER: ICD-10-CM

## 2023-09-27 DIAGNOSIS — G43.909 MIGRAINE WITHOUT STATUS MIGRAINOSUS, NOT INTRACTABLE, UNSPECIFIED MIGRAINE TYPE: ICD-10-CM

## 2023-09-27 DIAGNOSIS — E66.01 MORBID (SEVERE) OBESITY DUE TO EXCESS CALORIES: ICD-10-CM

## 2023-09-27 LAB
ALBUMIN SERPL BCP-MCNC: 3.9 G/DL (ref 3.5–5.2)
ALP SERPL-CCNC: 67 U/L (ref 55–135)
ALT SERPL W/O P-5'-P-CCNC: 20 U/L (ref 10–44)
ANION GAP SERPL CALC-SCNC: 10 MMOL/L (ref 8–16)
AST SERPL-CCNC: 21 U/L (ref 10–40)
BASOPHILS # BLD AUTO: 0.03 K/UL (ref 0–0.2)
BASOPHILS NFR BLD: 0.4 % (ref 0–1.9)
BILIRUB SERPL-MCNC: 0.9 MG/DL (ref 0.1–1)
BUN SERPL-MCNC: 11 MG/DL (ref 6–20)
CALCIUM SERPL-MCNC: 9 MG/DL (ref 8.7–10.5)
CHLORIDE SERPL-SCNC: 106 MMOL/L (ref 95–110)
CHOLEST SERPL-MCNC: 149 MG/DL (ref 120–199)
CHOLEST/HDLC SERPL: 3.7 {RATIO} (ref 2–5)
CO2 SERPL-SCNC: 24 MMOL/L (ref 23–29)
CREAT SERPL-MCNC: 0.8 MG/DL (ref 0.5–1.4)
DIFFERENTIAL METHOD: ABNORMAL
EOSINOPHIL # BLD AUTO: 0.1 K/UL (ref 0–0.5)
EOSINOPHIL NFR BLD: 1.2 % (ref 0–8)
ERYTHROCYTE [DISTWIDTH] IN BLOOD BY AUTOMATED COUNT: 12.4 % (ref 11.5–14.5)
EST. GFR  (NO RACE VARIABLE): >60 ML/MIN/1.73 M^2
ESTIMATED AVG GLUCOSE: 85 MG/DL (ref 68–131)
GLUCOSE SERPL-MCNC: 95 MG/DL (ref 70–110)
HBA1C MFR BLD: 4.6 % (ref 4–5.6)
HCT VFR BLD AUTO: 41.3 % (ref 37–48.5)
HDLC SERPL-MCNC: 40 MG/DL (ref 40–75)
HDLC SERPL: 26.8 % (ref 20–50)
HGB BLD-MCNC: 14 G/DL (ref 12–16)
IMM GRANULOCYTES # BLD AUTO: 0.05 K/UL (ref 0–0.04)
IMM GRANULOCYTES NFR BLD AUTO: 0.7 % (ref 0–0.5)
IRON SERPL-MCNC: 162 UG/DL (ref 30–160)
LDLC SERPL CALC-MCNC: 90.8 MG/DL (ref 63–159)
LYMPHOCYTES # BLD AUTO: 2.1 K/UL (ref 1–4.8)
LYMPHOCYTES NFR BLD: 27.2 % (ref 18–48)
MCH RBC QN AUTO: 29.2 PG (ref 27–31)
MCHC RBC AUTO-ENTMCNC: 33.9 G/DL (ref 32–36)
MCV RBC AUTO: 86 FL (ref 82–98)
MONOCYTES # BLD AUTO: 0.6 K/UL (ref 0.3–1)
MONOCYTES NFR BLD: 7.6 % (ref 4–15)
NEUTROPHILS # BLD AUTO: 4.8 K/UL (ref 1.8–7.7)
NEUTROPHILS NFR BLD: 62.9 % (ref 38–73)
NONHDLC SERPL-MCNC: 109 MG/DL
NRBC BLD-RTO: 0 /100 WBC
PLATELET # BLD AUTO: 261 K/UL (ref 150–450)
PMV BLD AUTO: 10.1 FL (ref 9.2–12.9)
POTASSIUM SERPL-SCNC: 4.1 MMOL/L (ref 3.5–5.1)
PROT SERPL-MCNC: 7.5 G/DL (ref 6–8.4)
RBC # BLD AUTO: 4.79 M/UL (ref 4–5.4)
SATURATED IRON: 45 % (ref 20–50)
SODIUM SERPL-SCNC: 140 MMOL/L (ref 136–145)
TOTAL IRON BINDING CAPACITY: 363 UG/DL (ref 250–450)
TRANSFERRIN SERPL-MCNC: 245 MG/DL (ref 200–375)
TRIGL SERPL-MCNC: 91 MG/DL (ref 30–150)
TSH SERPL DL<=0.005 MIU/L-ACNC: 1.32 UIU/ML (ref 0.4–4)
WBC # BLD AUTO: 7.61 K/UL (ref 3.9–12.7)

## 2023-09-27 PROCEDURE — 99214 PR OFFICE/OUTPT VISIT, EST, LEVL IV, 30-39 MIN: ICD-10-PCS | Mod: S$GLB,,, | Performed by: INTERNAL MEDICINE

## 2023-09-27 PROCEDURE — 3044F HG A1C LEVEL LT 7.0%: CPT | Mod: CPTII,S$GLB,, | Performed by: INTERNAL MEDICINE

## 2023-09-27 PROCEDURE — 80053 COMPREHEN METABOLIC PANEL: CPT | Performed by: INTERNAL MEDICINE

## 2023-09-27 PROCEDURE — 83036 HEMOGLOBIN GLYCOSYLATED A1C: CPT | Performed by: INTERNAL MEDICINE

## 2023-09-27 PROCEDURE — 1159F PR MEDICATION LIST DOCUMENTED IN MEDICAL RECORD: ICD-10-PCS | Mod: CPTII,S$GLB,, | Performed by: INTERNAL MEDICINE

## 2023-09-27 PROCEDURE — 3008F PR BODY MASS INDEX (BMI) DOCUMENTED: ICD-10-PCS | Mod: CPTII,S$GLB,, | Performed by: INTERNAL MEDICINE

## 2023-09-27 PROCEDURE — 3079F DIAST BP 80-89 MM HG: CPT | Mod: CPTII,S$GLB,, | Performed by: INTERNAL MEDICINE

## 2023-09-27 PROCEDURE — 3074F SYST BP LT 130 MM HG: CPT | Mod: CPTII,S$GLB,, | Performed by: INTERNAL MEDICINE

## 2023-09-27 PROCEDURE — 1159F MED LIST DOCD IN RCRD: CPT | Mod: CPTII,S$GLB,, | Performed by: INTERNAL MEDICINE

## 2023-09-27 PROCEDURE — 36415 COLL VENOUS BLD VENIPUNCTURE: CPT | Mod: ,,, | Performed by: STUDENT IN AN ORGANIZED HEALTH CARE EDUCATION/TRAINING PROGRAM

## 2023-09-27 PROCEDURE — 1160F RVW MEDS BY RX/DR IN RCRD: CPT | Mod: CPTII,S$GLB,, | Performed by: INTERNAL MEDICINE

## 2023-09-27 PROCEDURE — 3074F PR MOST RECENT SYSTOLIC BLOOD PRESSURE < 130 MM HG: ICD-10-PCS | Mod: CPTII,S$GLB,, | Performed by: INTERNAL MEDICINE

## 2023-09-27 PROCEDURE — 3079F PR MOST RECENT DIASTOLIC BLOOD PRESSURE 80-89 MM HG: ICD-10-PCS | Mod: CPTII,S$GLB,, | Performed by: INTERNAL MEDICINE

## 2023-09-27 PROCEDURE — 84466 ASSAY OF TRANSFERRIN: CPT | Performed by: INTERNAL MEDICINE

## 2023-09-27 PROCEDURE — 85025 COMPLETE CBC W/AUTO DIFF WBC: CPT | Performed by: INTERNAL MEDICINE

## 2023-09-27 PROCEDURE — 1160F PR REVIEW ALL MEDS BY PRESCRIBER/CLIN PHARMACIST DOCUMENTED: ICD-10-PCS | Mod: CPTII,S$GLB,, | Performed by: INTERNAL MEDICINE

## 2023-09-27 PROCEDURE — 83540 ASSAY OF IRON: CPT | Performed by: INTERNAL MEDICINE

## 2023-09-27 PROCEDURE — 3008F BODY MASS INDEX DOCD: CPT | Mod: CPTII,S$GLB,, | Performed by: INTERNAL MEDICINE

## 2023-09-27 PROCEDURE — 99214 OFFICE O/P EST MOD 30 MIN: CPT | Mod: S$GLB,,, | Performed by: INTERNAL MEDICINE

## 2023-09-27 PROCEDURE — 84443 ASSAY THYROID STIM HORMONE: CPT | Performed by: INTERNAL MEDICINE

## 2023-09-27 PROCEDURE — 3044F PR MOST RECENT HEMOGLOBIN A1C LEVEL <7.0%: ICD-10-PCS | Mod: CPTII,S$GLB,, | Performed by: INTERNAL MEDICINE

## 2023-09-27 PROCEDURE — 80061 LIPID PANEL: CPT | Performed by: INTERNAL MEDICINE

## 2023-09-27 PROCEDURE — 36415 PR COLLECTION VENOUS BLOOD,VENIPUNCTURE: ICD-10-PCS | Mod: ,,, | Performed by: STUDENT IN AN ORGANIZED HEALTH CARE EDUCATION/TRAINING PROGRAM

## 2023-09-27 NOTE — PROGRESS NOTES
Subjective:       Patient ID: Josesito Moore is a 23 y.o. female.    Chief Complaint: Establish Care (Patient here for to discuss CPAP), Insomnia, and Obesity (With sleep apnea)      Patient is established already .......... presents today for a f/u visit , to discuss labs results and for management of the chronic conditions including sleep apnea  She also had concerns about her weight and requests referral to bariatric surgery        Apnea  This is a chronic problem. The current episode started more than 1 year ago. The problem occurs constantly. The problem has been waxing and waning. Pertinent negatives include no fever. Treatments tried: CPAP. The treatment provided significant relief.     Review of Systems   Constitutional:  Negative for activity change, fever and unexpected weight change.   Respiratory:          Night snoring, apnea   Cardiovascular: Negative.    Neurological: Negative.          Objective:      Physical Exam  Vitals and nursing note reviewed.   Constitutional:       Appearance: Normal appearance. She is obese.   Cardiovascular:      Rate and Rhythm: Normal rate and regular rhythm.   Pulmonary:      Effort: Pulmonary effort is normal.      Breath sounds: Normal breath sounds.   Musculoskeletal:      Cervical back: Normal range of motion and neck supple.   Neurological:      Mental Status: She is alert.         Assessment:       1. Morbid (severe) obesity due to excess calories  -     Ambulatory referral/consult to Bariatric Surgery; Future; Expected date: 10/04/2023  -     Hemoglobin A1C; Standing    2. MIKI (obstructive sleep apnea)  Overview:  Dx with MIKI Oct 2022    Assessment & Plan:  Continue Auto CPAP at current pressure settings of 14.5 cms H2O    Orders:  -     Ambulatory referral/consult to Bariatric Surgery; Future; Expected date: 10/04/2023  -     TSH; Future; Expected date: 09/27/2023    3. Migraine without status migrainosus, not intractable, unspecified migraine type    4.  Pharmacologic therapy  -     Comprehensive Metabolic Panel; Future; Expected date: 09/27/2023    5. Metrorrhagia  -     CBC Auto Differential; Future; Expected date: 09/27/2023  -     Iron and TIBC; Future; Expected date: 09/27/2023    6. Encounter for screening for lipid disorder  -     Lipid Panel; Future; Expected date: 09/27/2023           Plan:       1. Morbid (severe) obesity due to excess calories  -     Ambulatory referral/consult to Bariatric Surgery; Future; Expected date: 10/04/2023  -     Hemoglobin A1C; Standing    2. MIKI (obstructive sleep apnea)  Overview:  Dx with MIKI Oct 2022    Assessment & Plan:  Continue Auto CPAP at current pressure settings of 14.5 cms H2O    Orders:  -     Ambulatory referral/consult to Bariatric Surgery; Future; Expected date: 10/04/2023  -     TSH; Future; Expected date: 09/27/2023    3. Migraine without status migrainosus, not intractable, unspecified migraine type    4. Pharmacologic therapy  -     Comprehensive Metabolic Panel; Future; Expected date: 09/27/2023    5. Metrorrhagia  -     CBC Auto Differential; Future; Expected date: 09/27/2023  -     Iron and TIBC; Future; Expected date: 09/27/2023    6. Encounter for screening for lipid disorder  -     Lipid Panel; Future; Expected date: 09/27/2023

## 2023-09-28 ENCOUNTER — PATIENT MESSAGE (OUTPATIENT)
Dept: FAMILY MEDICINE | Facility: CLINIC | Age: 23
End: 2023-09-28
Payer: MEDICAID

## 2023-10-02 ENCOUNTER — PATIENT MESSAGE (OUTPATIENT)
Dept: FAMILY MEDICINE | Facility: CLINIC | Age: 23
End: 2023-10-02
Payer: MEDICAID

## 2023-10-09 ENCOUNTER — PATIENT OUTREACH (OUTPATIENT)
Dept: ADMINISTRATIVE | Facility: HOSPITAL | Age: 23
End: 2023-10-09
Payer: MEDICAID

## 2023-10-09 ENCOUNTER — PATIENT MESSAGE (OUTPATIENT)
Dept: ADMINISTRATIVE | Facility: HOSPITAL | Age: 23
End: 2023-10-09
Payer: MEDICAID

## 2023-10-18 ENCOUNTER — TELEPHONE (OUTPATIENT)
Dept: FAMILY MEDICINE | Facility: CLINIC | Age: 23
End: 2023-10-18
Payer: MEDICAID

## 2023-10-18 NOTE — TELEPHONE ENCOUNTER
----- Message from Leeanna Lynn sent at 10/18/2023  1:18 PM CDT -----  Regarding: advise  Contact: mahad  Type: Needs Medical Advice  Who Called: rotec  Symptoms (please be specific):    How long has patient had these symptoms:    Pharmacy name and phone #:    Best Call Back Number: [phone]    Additional Information: Needs the sleep pap notes snt notify once it has snt thanks! Fax: [phone] #: [phone].

## 2023-10-24 ENCOUNTER — PATIENT MESSAGE (OUTPATIENT)
Dept: ADMINISTRATIVE | Facility: HOSPITAL | Age: 23
End: 2023-10-24
Payer: MEDICAID

## 2024-08-17 ENCOUNTER — HOSPITAL ENCOUNTER (EMERGENCY)
Facility: HOSPITAL | Age: 24
Discharge: HOME | End: 2024-08-17
Payer: MEDICAID

## 2024-08-17 VITALS
BODY MASS INDEX: 44.41 KG/M2 | HEART RATE: 89 BPM | SYSTOLIC BLOOD PRESSURE: 139 MMHG | WEIGHT: 293 LBS | HEIGHT: 68 IN | OXYGEN SATURATION: 99 % | RESPIRATION RATE: 20 BRPM | DIASTOLIC BLOOD PRESSURE: 87 MMHG | TEMPERATURE: 98 F

## 2024-08-17 DIAGNOSIS — M25.562 ACUTE PAIN OF LEFT KNEE: Primary | ICD-10-CM

## 2024-08-17 DIAGNOSIS — R52 PAIN: ICD-10-CM

## 2024-08-17 PROCEDURE — 73562 X-RAY EXAM OF KNEE 3: CPT | Mod: TC,LT

## 2024-08-17 PROCEDURE — 63600175 PHARM REV CODE 636 W HCPCS: Mod: UD | Performed by: NURSE PRACTITIONER

## 2024-08-17 PROCEDURE — 96372 THER/PROPH/DIAG INJ SC/IM: CPT | Performed by: NURSE PRACTITIONER

## 2024-08-17 PROCEDURE — 73562 X-RAY EXAM OF KNEE 3: CPT | Mod: 26,LT,, | Performed by: RADIOLOGY

## 2024-08-17 PROCEDURE — 99284 EMERGENCY DEPT VISIT MOD MDM: CPT | Mod: 25

## 2024-08-17 RX ORDER — KETOROLAC TROMETHAMINE 30 MG/ML
60 INJECTION, SOLUTION INTRAMUSCULAR; INTRAVENOUS
Status: COMPLETED | OUTPATIENT
Start: 2024-08-17 | End: 2024-08-17

## 2024-08-17 RX ORDER — IBUPROFEN 800 MG/1
800 TABLET ORAL EVERY 8 HOURS PRN
Qty: 12 TABLET | Refills: 0 | Status: SHIPPED | OUTPATIENT
Start: 2024-08-17

## 2024-08-17 RX ADMIN — KETOROLAC TROMETHAMINE 60 MG: 30 INJECTION, SOLUTION INTRAMUSCULAR at 06:08

## 2024-08-17 NOTE — ED PROVIDER NOTES
"Encounter Date: 2024       History     Chief Complaint   Patient presents with    Knee Pain     L knee pain after wrestling with a friend last night.      Presents to ED with complaints of left knee pain.  States she was "wrestling" with a friend last night and injured her knee.  Not taken anything for her symptoms. Increased pain on ROM of left knee and weight bearing      Review of patient's allergies indicates:  No Known Allergies  Past Medical History:   Diagnosis Date    ADHD (attention deficit hyperactivity disorder)     Depression     Dermoid cyst of scalp     Dizzy     Enlarged heart     Heart murmur of      Obesity      Past Surgical History:   Procedure Laterality Date    Excision of cyst of scalp  2018     Family History   Problem Relation Name Age of Onset    Heart disease Mother      Heart disease Maternal Aunt      Heart disease Maternal Uncle      Heart disease Maternal Grandmother      Diabetes Maternal Grandmother      Heart disease Maternal Grandfather      Diabetes Paternal Grandfather       Social History     Tobacco Use    Smoking status: Never    Smokeless tobacco: Never   Substance Use Topics    Alcohol use: No    Drug use: No     Review of Systems   Musculoskeletal:         Left knee pain       Physical Exam     Initial Vitals [24 1546]   BP Pulse Resp Temp SpO2   139/87 89 20 98 °F (36.7 °C) 99 %      MAP       --         Physical Exam    Nursing note and vitals reviewed.  Constitutional: She appears well-developed and well-nourished.   HENT:   Head: Normocephalic and atraumatic.   Eyes: EOM are normal.   Neck: Neck supple.   Cardiovascular:  Normal rate and regular rhythm.           Pulmonary/Chest: Breath sounds normal.   Musculoskeletal:      Cervical back: Neck supple.      Comments: Left knee without redness/erythema. Ambulatory. No bruising or abrasions     Neurological: She is alert and oriented to person, place, and time.   Skin: Skin is warm and dry. " Capillary refill takes less than 2 seconds.   Psychiatric: She has a normal mood and affect.         ED Course   Procedures  Labs Reviewed - No data to display       Imaging Results              X-Ray Knee 3 View Left (Final result)  Result time 08/17/24 17:20:12      Final result by Charlene Viera MD (08/17/24 17:20:12)                   Impression:      As above      Electronically signed by: Charlene Viera MD  Date:    08/17/2024  Time:    17:20               Narrative:    EXAMINATION:  XR KNEE 3 VIEW LEFT    CLINICAL HISTORY:  Pain, unspecified    TECHNIQUE:  AP, lateral, and oblique views of the left knee were performed.    COMPARISON:  10/19/2010    FINDINGS:  There is no acute fracture or dislocation.  No significant joint effusion.                                       Medications   ketorolac injection 60 mg (60 mg Intramuscular Given 8/17/24 1809)     Medical Decision Making  24 year old with knee pain. Thinks she injured last night while play wrestling a family member      Differential Diagnoses: Strain, dislocation, fracture,     Amount and/or Complexity of Data Reviewed  Radiology: ordered. Decision-making details documented in ED Course.    Risk  Prescription drug management.               ED Course as of 08/17/24 1831   Sat Aug 17, 2024   1723 X-Ray Knee 3 View Left  Negative [NP]      ED Course User Index  [NP] Brandy Person FNP                           Clinical Impression:  Final diagnoses:  [R52] Pain  [M25.562] Acute pain of left knee (Primary)          ED Disposition Condition    Discharge Good          ED Prescriptions       Medication Sig Dispense Start Date End Date Auth. Provider    ibuprofen (ADVIL,MOTRIN) 800 MG tablet Take 1 tablet (800 mg total) by mouth every 8 (eight) hours as needed for Pain. 12 tablet 8/17/2024 -- Brandy Person FNP          Follow-up Information       Follow up With Specialties Details Why Contact Info    PCP   Schedule an appointment              Raza  Brandy COVARRUBIAS, Health system  08/17/24 1831       Brandy Person, Health system  08/17/24 1846

## 2024-08-28 ENCOUNTER — TELEPHONE (OUTPATIENT)
Dept: SURGERY | Facility: CLINIC | Age: 24
End: 2024-08-28
Payer: MEDICAID

## 2024-08-28 NOTE — TELEPHONE ENCOUNTER
----- Message from Kar Casper sent at 8/28/2024  2:57 PM CDT -----  Type: Needs Medical Advice    Who Called:  Pt      Best Call Back Number: [phone]  Additional Information: Pt needs to schedule np frederic to remove cyst from head, used to see hanny.  Please call back to advise, Thanks!

## 2024-08-30 ENCOUNTER — OFFICE VISIT (OUTPATIENT)
Dept: SURGERY | Facility: CLINIC | Age: 24
End: 2024-08-30
Payer: MEDICAID

## 2024-08-30 VITALS — BODY MASS INDEX: 43.4 KG/M2 | HEIGHT: 69 IN | WEIGHT: 293 LBS

## 2024-08-30 DIAGNOSIS — D23.4 CYST, DERMOID, SCALP AND NECK: Primary | ICD-10-CM

## 2024-08-30 PROCEDURE — 99999 PR PBB SHADOW E&M-EST. PATIENT-LVL III: CPT | Mod: PBBFAC,,, | Performed by: SPECIALIST

## 2024-08-30 PROCEDURE — 99213 OFFICE O/P EST LOW 20 MIN: CPT | Mod: PBBFAC | Performed by: SPECIALIST

## 2024-08-30 RX ORDER — SODIUM CHLORIDE 9 MG/ML
INJECTION, SOLUTION INTRAVENOUS CONTINUOUS
OUTPATIENT
Start: 2024-08-30

## 2024-08-30 NOTE — H&P (VIEW-ONLY)
"Subjective     Patient ID: Josesito Moore  is a 24 y.o. female     Chief Complaint:   Chief Complaint   Patient presents with    Cyst     R middle scalp- approx pea sized      Vitals:    24 0956   Weight: (!) 137.4 kg (303 lb)   Height: 5' 9" (1.753 m)       HPI     Cyst     Additional comments: R middle scalp- approx pea sized          Last edited by Ameena Hunter LPN on 2024  9:57 AM.      Patient was referred in for evaluation of the scalp sebaceous cyst has been present for some time.  Causing discomfort when she brushes her hair and takes a shower.  It is not actively inflamed but has been inflamed in the past.  She has had multiple of these lesions excised in the operating room by a local surgeon.  Past Medical History:   Diagnosis Date    ADHD (attention deficit hyperactivity disorder)     Depression     Dermoid cyst of scalp     Dizzy     Enlarged heart     Heart murmur of      Obesity      Past Surgical History:   Procedure Laterality Date    Excision of cyst of scalp  2018     Family History   Problem Relation Name Age of Onset    Heart disease Mother      Heart disease Maternal Aunt      Heart disease Maternal Uncle      Heart disease Maternal Grandmother      Diabetes Maternal Grandmother      Heart disease Maternal Grandfather      Diabetes Paternal Grandfather       Past Surgical History:   Procedure Laterality Date    Excision of cyst of scalp  2018     Social History     Socioeconomic History    Marital status: Single   Tobacco Use    Smoking status: Never    Smokeless tobacco: Never   Substance and Sexual Activity    Alcohol use: No    Drug use: No    Sexual activity: Not Currently     Partners: Female     Birth control/protection: None      No current outpatient medications on file.   Review of patient's allergies indicates:  No Known Allergies         Review of Systems   Skin:         Patient reports multiple scalp cysts in the past now with 1 of the apex of her " scalp over the mid occipital region.  Tender in nature proximally the size of a large P no drainage.        I have reviewed the following:     Details / Date    []   Labs     []   Micro     []   Pathology     []   Imaging     []   Cardiology Procedures     []   Other         Objective         Physical Exam  Vitals and nursing note reviewed. Exam conducted with a chaperone present.   Constitutional:       Appearance: Normal appearance. She is normal weight.   HENT:      Head: Normocephalic and atraumatic.        Comments: Proximally 1-1/2 cm cyst scalp at the apex of the occipital region just to the right of the midline.  Causing tenderness and discomfort routinely when touched or brushing her hair washing her hair     Mouth/Throat:      Mouth: Mucous membranes are moist.   Eyes:      Extraocular Movements: Extraocular movements intact.      Conjunctiva/sclera: Conjunctivae normal.      Pupils: Pupils are equal, round, and reactive to light.   Cardiovascular:      Rate and Rhythm: Normal rate.      Pulses: Normal pulses.   Pulmonary:      Effort: Pulmonary effort is normal.   Abdominal:      General: Abdomen is flat.      Palpations: Abdomen is soft.   Musculoskeletal:         General: Normal range of motion.      Cervical back: Normal range of motion and neck supple.   Skin:     General: Skin is warm.   Neurological:      General: No focal deficit present.      Mental Status: She is alert and oriented to person, place, and time. Mental status is at baseline.   Psychiatric:         Mood and Affect: Mood normal.          Assessment and Plan     1. Cyst, dermoid, scalp and neck  -     Vital signs; Standing  -     Insert peripheral IV; Standing  -     Diet NPO; Standing  -     Case Request Operating Room: EXCISION, SUPERFICIAL MASS, HEAD  -     Full code; Standing  -     Place in Outpatient; Standing  -     Place JOANN hose; Standing    Other orders  -     0.9%  NaCl infusion  -     IP VTE LOW RISK PATIENT; Standing  -      ceFAZolin 2 g in D5W 50 mL IVPB (MB+)       Orders Placed This Encounter   Procedures    Insert peripheral IV    Patient with scalp cyst.  Currently mildly inflamed.  Needs to be excised.  Plan to do this in the operating room under anesthesia    An After Visit Summary was printed and given to the patient.       Siva Gunter MD  [facility] 2nd Floor General Surgery  [address] [facility] [place] [address]  [phone]     This note was created using [employer] direct voice recognition software. Note may have occasional typographical errors that may not have been identified and edited despite initial review prior to signing.     Patient instructed that best way to communicate with my office staff is for patient to get on the [facility] epic patient portal to expedite communication and communication issues that may occur.  Patient was given instructions on how to get on the portal.  I encouraged patient to obtain portal access as well.  Ultimately it is up to the patient to obtain access.  Patient voiced understanding.

## 2024-08-30 NOTE — PROGRESS NOTES
"Subjective     Patient ID: Josesito Moore  is a 24 y.o. female     Chief Complaint:   Chief Complaint   Patient presents with    Cyst     R middle scalp- approx pea sized      Vitals:    24 0956   Weight: (!) 137.4 kg (303 lb)   Height: 5' 9" (1.753 m)       HPI     Cyst     Additional comments: R middle scalp- approx pea sized          Last edited by Ameena Hunter LPN on 2024  9:57 AM.      Patient was referred in for evaluation of the scalp sebaceous cyst has been present for some time.  Causing discomfort when she brushes her hair and takes a shower.  It is not actively inflamed but has been inflamed in the past.  She has had multiple of these lesions excised in the operating room by a local surgeon.  Past Medical History:   Diagnosis Date    ADHD (attention deficit hyperactivity disorder)     Depression     Dermoid cyst of scalp     Dizzy     Enlarged heart     Heart murmur of      Obesity      Past Surgical History:   Procedure Laterality Date    Excision of cyst of scalp  2018     Family History   Problem Relation Name Age of Onset    Heart disease Mother      Heart disease Maternal Aunt      Heart disease Maternal Uncle      Heart disease Maternal Grandmother      Diabetes Maternal Grandmother      Heart disease Maternal Grandfather      Diabetes Paternal Grandfather       Past Surgical History:   Procedure Laterality Date    Excision of cyst of scalp  2018     Social History     Socioeconomic History    Marital status: Single   Tobacco Use    Smoking status: Never    Smokeless tobacco: Never   Substance and Sexual Activity    Alcohol use: No    Drug use: No    Sexual activity: Not Currently     Partners: Female     Birth control/protection: None      No current outpatient medications on file.   Review of patient's allergies indicates:  No Known Allergies         Review of Systems   Skin:         Patient reports multiple scalp cysts in the past now with 1 of the apex of her " scalp over the mid occipital region.  Tender in nature proximally the size of a large P no drainage.        I have reviewed the following:     Details / Date    []   Labs     []   Micro     []   Pathology     []   Imaging     []   Cardiology Procedures     []   Other         Objective         Physical Exam  Vitals and nursing note reviewed. Exam conducted with a chaperone present.   Constitutional:       Appearance: Normal appearance. She is normal weight.   HENT:      Head: Normocephalic and atraumatic.        Comments: Proximally 1-1/2 cm cyst scalp at the apex of the occipital region just to the right of the midline.  Causing tenderness and discomfort routinely when touched or brushing her hair washing her hair     Mouth/Throat:      Mouth: Mucous membranes are moist.   Eyes:      Extraocular Movements: Extraocular movements intact.      Conjunctiva/sclera: Conjunctivae normal.      Pupils: Pupils are equal, round, and reactive to light.   Cardiovascular:      Rate and Rhythm: Normal rate.      Pulses: Normal pulses.   Pulmonary:      Effort: Pulmonary effort is normal.   Abdominal:      General: Abdomen is flat.      Palpations: Abdomen is soft.   Musculoskeletal:         General: Normal range of motion.      Cervical back: Normal range of motion and neck supple.   Skin:     General: Skin is warm.   Neurological:      General: No focal deficit present.      Mental Status: She is alert and oriented to person, place, and time. Mental status is at baseline.   Psychiatric:         Mood and Affect: Mood normal.          Assessment and Plan     1. Cyst, dermoid, scalp and neck  -     Vital signs; Standing  -     Insert peripheral IV; Standing  -     Diet NPO; Standing  -     Case Request Operating Room: EXCISION, SUPERFICIAL MASS, HEAD  -     Full code; Standing  -     Place in Outpatient; Standing  -     Place JOANN hose; Standing    Other orders  -     0.9%  NaCl infusion  -     IP VTE LOW RISK PATIENT; Standing  -      ceFAZolin 2 g in D5W 50 mL IVPB (MB+)       Orders Placed This Encounter   Procedures    Insert peripheral IV    Patient with scalp cyst.  Currently mildly inflamed.  Needs to be excised.  Plan to do this in the operating room under anesthesia    An After Visit Summary was printed and given to the patient.       Siva Gunter MD  [facility] 2nd Floor General Surgery  [address] [facility] [place] [address]  [phone]     This note was created using [email or web address] direct voice recognition software. Note may have occasional typographical errors that may not have been identified and edited despite initial review prior to signing.     Patient instructed that best way to communicate with my office staff is for patient to get on the [facility] epic patient portal to expedite communication and communication issues that may occur.  Patient was given instructions on how to get on the portal.  I encouraged patient to obtain portal access as well.  Ultimately it is up to the patient to obtain access.  Patient voiced understanding.

## 2024-09-04 ENCOUNTER — ANESTHESIA EVENT (OUTPATIENT)
Dept: SURGERY | Facility: HOSPITAL | Age: 24
End: 2024-09-04
Payer: MEDICAID

## 2024-09-04 NOTE — ANESTHESIA PREPROCEDURE EVALUATION
09/04/2024  Josesito Moore is a 24 y.o., female.   has a past surgical history that includes Excision of cyst of scalp (04/30/2018).       Pre-op Assessment    I have reviewed the Patient Summary Reports.     I have reviewed the Nursing Notes. I have reviewed the NPO Status.   I have reviewed the Medications.     Review of Systems  Anesthesia Hx:  No problems with previous Anesthesia             Denies Family Hx of Anesthesia complications.    Denies Personal Hx of Anesthesia complications.                    Social:  Non-Smoker       Hematology/Oncology:  Hematology Normal   Oncology Normal                                   EENT/Dental:  EENT/Dental Normal           Cardiovascular:  Cardiovascular Normal                  ECG has been reviewed. 2/23 ECG- NSR                         Pulmonary:        Sleep Apnea                Renal/:  Renal/ Normal                 Hepatic/GI:  Hepatic/GI Normal                 Musculoskeletal:  Musculoskeletal Normal                Neurological:  Neurology Normal                                      Endocrine:        Morbid Obesity / BMI > 40  Psych:  Psychiatric History  depression              Physical Exam  General: Well nourished, Cooperative, Alert and Oriented    Airway:  Mallampati: II   Mouth Opening: Normal  TM Distance: Normal  Tongue: Normal  Neck ROM: Normal ROM    Dental:  Intact    Chest/Lungs:  Clear to auscultation, Normal Respiratory Rate    Heart:  Rate: Normal  Rhythm: Regular Rhythm    Anesthesia Plan  Type of Anesthesia, risks & benefits discussed:    Anesthesia Type: Gen Supraglottic Airway  Intra-op Monitoring Plan: Standard ASA Monitors  Post Op Pain Control Plan: IV/PO Opioids PRN  Induction:  IV  Informed Consent: Informed consent signed with the Patient and all parties understand the risks and agree with anesthesia plan.  All questions answered.    ASA Score: 2  Day of Surgery Review of History & Physical: H&P Update referred to the surgeon/provider.    Ready For Surgery From Anesthesia Perspective.   .

## 2024-09-11 ENCOUNTER — ANESTHESIA (OUTPATIENT)
Dept: SURGERY | Facility: HOSPITAL | Age: 24
End: 2024-09-11
Payer: MEDICAID

## 2024-09-18 ENCOUNTER — HOSPITAL ENCOUNTER (OUTPATIENT)
Facility: HOSPITAL | Age: 24
Discharge: HOME | End: 2024-09-18
Attending: SPECIALIST | Admitting: SPECIALIST
Payer: MEDICAID

## 2024-09-18 VITALS
BODY MASS INDEX: 43.4 KG/M2 | WEIGHT: 293 LBS | SYSTOLIC BLOOD PRESSURE: 133 MMHG | TEMPERATURE: 98 F | OXYGEN SATURATION: 99 % | DIASTOLIC BLOOD PRESSURE: 68 MMHG | HEIGHT: 69 IN | RESPIRATION RATE: 14 BRPM | HEART RATE: 81 BPM

## 2024-09-18 VITALS — RESPIRATION RATE: 20 BRPM

## 2024-09-18 DIAGNOSIS — D23.4 CYST, DERMOID, SCALP AND NECK: Primary | ICD-10-CM

## 2024-09-18 LAB — B-HCG UR QL: NEGATIVE

## 2024-09-18 PROCEDURE — 63600175 PHARM REV CODE 636 W HCPCS: Mod: UD | Performed by: NURSE ANESTHETIST, CERTIFIED REGISTERED

## 2024-09-18 PROCEDURE — 81025 URINE PREGNANCY TEST: CPT | Performed by: SPECIALIST

## 2024-09-18 PROCEDURE — 36000706: Performed by: SPECIALIST

## 2024-09-18 PROCEDURE — 88304 TISSUE EXAM BY PATHOLOGIST: CPT | Mod: 26,,, | Performed by: PATHOLOGY

## 2024-09-18 PROCEDURE — 71000039 HC RECOVERY, EACH ADD'L HOUR: Performed by: SPECIALIST

## 2024-09-18 PROCEDURE — 37000009 HC ANESTHESIA EA ADD 15 MINS: Performed by: SPECIALIST

## 2024-09-18 PROCEDURE — 25000003 PHARM REV CODE 250: Performed by: NURSE ANESTHETIST, CERTIFIED REGISTERED

## 2024-09-18 PROCEDURE — 63600175 PHARM REV CODE 636 W HCPCS: Performed by: SPECIALIST

## 2024-09-18 PROCEDURE — 25000003 PHARM REV CODE 250: Performed by: SPECIALIST

## 2024-09-18 PROCEDURE — 37000008 HC ANESTHESIA 1ST 15 MINUTES: Performed by: SPECIALIST

## 2024-09-18 PROCEDURE — 36000707: Performed by: SPECIALIST

## 2024-09-18 PROCEDURE — 71000015 HC POSTOP RECOV 1ST HR: Performed by: SPECIALIST

## 2024-09-18 PROCEDURE — 88304 TISSUE EXAM BY PATHOLOGIST: CPT | Performed by: PATHOLOGY

## 2024-09-18 PROCEDURE — 11422 EXC H-F-NK-SP B9+MARG 1.1-2: CPT | Mod: ,,, | Performed by: SPECIALIST

## 2024-09-18 PROCEDURE — 71000033 HC RECOVERY, INTIAL HOUR: Performed by: SPECIALIST

## 2024-09-18 RX ORDER — ACETAMINOPHEN 10 MG/ML
INJECTION, SOLUTION INTRAVENOUS
Status: DISCONTINUED | OUTPATIENT
Start: 2024-09-18 | End: 2024-09-18

## 2024-09-18 RX ORDER — DEXAMETHASONE SODIUM PHOSPHATE 4 MG/ML
INJECTION, SOLUTION INTRA-ARTICULAR; INTRALESIONAL; INTRAMUSCULAR; INTRAVENOUS; SOFT TISSUE
Status: DISCONTINUED | OUTPATIENT
Start: 2024-09-18 | End: 2024-09-18

## 2024-09-18 RX ORDER — ROCURONIUM BROMIDE 10 MG/ML
INJECTION, SOLUTION INTRAVENOUS
Status: DISCONTINUED | OUTPATIENT
Start: 2024-09-18 | End: 2024-09-18

## 2024-09-18 RX ORDER — MIDAZOLAM HYDROCHLORIDE 1 MG/ML
INJECTION INTRAMUSCULAR; INTRAVENOUS
Status: DISCONTINUED | OUTPATIENT
Start: 2024-09-18 | End: 2024-09-18

## 2024-09-18 RX ORDER — SODIUM CHLORIDE 9 MG/ML
INJECTION, SOLUTION INTRAVENOUS CONTINUOUS
Status: DISCONTINUED | OUTPATIENT
Start: 2024-09-18 | End: 2024-09-18 | Stop reason: HOSPADM

## 2024-09-18 RX ORDER — MORPHINE SULFATE 2 MG/ML
INJECTION, SOLUTION INTRAMUSCULAR; INTRAVENOUS
Status: DISCONTINUED | OUTPATIENT
Start: 2024-09-18 | End: 2024-09-18

## 2024-09-18 RX ORDER — LIDOCAINE HYDROCHLORIDE 20 MG/ML
INJECTION, SOLUTION EPIDURAL; INFILTRATION; INTRACAUDAL; PERINEURAL
Status: DISCONTINUED | OUTPATIENT
Start: 2024-09-18 | End: 2024-09-18

## 2024-09-18 RX ORDER — ONDANSETRON HYDROCHLORIDE 2 MG/ML
INJECTION, SOLUTION INTRAVENOUS
Status: DISCONTINUED | OUTPATIENT
Start: 2024-09-18 | End: 2024-09-18

## 2024-09-18 RX ORDER — GLUCAGON 1 MG
1 KIT INJECTION
Status: DISCONTINUED | OUTPATIENT
Start: 2024-09-18 | End: 2024-09-18 | Stop reason: HOSPADM

## 2024-09-18 RX ORDER — MUPIROCIN 20 MG/G
OINTMENT TOPICAL
Status: DISCONTINUED | OUTPATIENT
Start: 2024-09-18 | End: 2024-09-18 | Stop reason: HOSPADM

## 2024-09-18 RX ORDER — HYDROMORPHONE HYDROCHLORIDE 1 MG/ML
0.5 INJECTION, SOLUTION INTRAMUSCULAR; INTRAVENOUS; SUBCUTANEOUS EVERY 5 MIN PRN
Status: DISCONTINUED | OUTPATIENT
Start: 2024-09-18 | End: 2024-09-18 | Stop reason: HOSPADM

## 2024-09-18 RX ORDER — PROPOFOL 10 MG/ML
VIAL (ML) INTRAVENOUS
Status: DISCONTINUED | OUTPATIENT
Start: 2024-09-18 | End: 2024-09-18

## 2024-09-18 RX ORDER — SODIUM CHLORIDE 9 MG/ML
INJECTION, SOLUTION INTRAVENOUS CONTINUOUS
Status: CANCELLED | OUTPATIENT
Start: 2024-09-18

## 2024-09-18 RX ORDER — MEPERIDINE HYDROCHLORIDE 50 MG/ML
12.5 INJECTION INTRAMUSCULAR; INTRAVENOUS; SUBCUTANEOUS EVERY 10 MIN PRN
Status: DISCONTINUED | OUTPATIENT
Start: 2024-09-18 | End: 2024-09-18 | Stop reason: HOSPADM

## 2024-09-18 RX ORDER — SUCCINYLCHOLINE CHLORIDE 20 MG/ML
INJECTION INTRAMUSCULAR; INTRAVENOUS
Status: DISCONTINUED | OUTPATIENT
Start: 2024-09-18 | End: 2024-09-18

## 2024-09-18 RX ORDER — ONDANSETRON HYDROCHLORIDE 2 MG/ML
4 INJECTION, SOLUTION INTRAVENOUS DAILY PRN
Status: DISCONTINUED | OUTPATIENT
Start: 2024-09-18 | End: 2024-09-18 | Stop reason: HOSPADM

## 2024-09-18 RX ORDER — SODIUM CHLORIDE, SODIUM LACTATE, POTASSIUM CHLORIDE, CALCIUM CHLORIDE 600; 310; 30; 20 MG/100ML; MG/100ML; MG/100ML; MG/100ML
INJECTION, SOLUTION INTRAVENOUS CONTINUOUS
Status: DISCONTINUED | OUTPATIENT
Start: 2024-09-18 | End: 2024-09-18 | Stop reason: HOSPADM

## 2024-09-18 RX ORDER — SODIUM CHLORIDE, SODIUM LACTATE, POTASSIUM CHLORIDE, CALCIUM CHLORIDE 600; 310; 30; 20 MG/100ML; MG/100ML; MG/100ML; MG/100ML
125 INJECTION, SOLUTION INTRAVENOUS CONTINUOUS
Status: DISCONTINUED | OUTPATIENT
Start: 2024-09-18 | End: 2024-09-18 | Stop reason: HOSPADM

## 2024-09-18 RX ORDER — LIDOCAINE HYDROCHLORIDE 10 MG/ML
1 INJECTION, SOLUTION EPIDURAL; INFILTRATION; INTRACAUDAL; PERINEURAL ONCE
Status: DISCONTINUED | OUTPATIENT
Start: 2024-09-18 | End: 2024-09-18 | Stop reason: HOSPADM

## 2024-09-18 RX ORDER — OXYCODONE HYDROCHLORIDE 5 MG/1
5 TABLET ORAL ONCE AS NEEDED
Status: DISCONTINUED | OUTPATIENT
Start: 2024-09-18 | End: 2024-09-18 | Stop reason: HOSPADM

## 2024-09-18 RX ORDER — KETOROLAC TROMETHAMINE 30 MG/ML
INJECTION, SOLUTION INTRAMUSCULAR; INTRAVENOUS
Status: DISCONTINUED | OUTPATIENT
Start: 2024-09-18 | End: 2024-09-18

## 2024-09-18 RX ADMIN — MORPHINE SULFATE 2 MG: 2 INJECTION, SOLUTION INTRAMUSCULAR; INTRAVENOUS at 08:09

## 2024-09-18 RX ADMIN — KETOROLAC TROMETHAMINE 30 MG: 30 INJECTION, SOLUTION INTRAMUSCULAR at 08:09

## 2024-09-18 RX ADMIN — ACETAMINOPHEN 1000 MG: 10 INJECTION INTRAVENOUS at 08:09

## 2024-09-18 RX ADMIN — CEFAZOLIN 2 G: 2 INJECTION, POWDER, FOR SOLUTION INTRAMUSCULAR; INTRAVENOUS at 08:09

## 2024-09-18 RX ADMIN — PROPOFOL 200 MG: 10 INJECTION, EMULSION INTRAVENOUS at 08:09

## 2024-09-18 RX ADMIN — ROCURONIUM BROMIDE 20 MG: 10 INJECTION, SOLUTION INTRAVENOUS at 08:09

## 2024-09-18 RX ADMIN — ONDANSETRON 4 MG: 2 INJECTION INTRAMUSCULAR; INTRAVENOUS at 08:09

## 2024-09-18 RX ADMIN — SUGAMMADEX 200 MG: 100 INJECTION, SOLUTION INTRAVENOUS at 08:09

## 2024-09-18 RX ADMIN — SUCCINYLCHOLINE CHLORIDE 100 MG: 20 INJECTION, SOLUTION INTRAMUSCULAR; INTRAVENOUS; PARENTERAL at 08:09

## 2024-09-18 RX ADMIN — DEXAMETHASONE SODIUM PHOSPHATE 4 MG: 4 INJECTION INTRA-ARTICULAR; INTRALESIONAL; INTRAMUSCULAR; INTRAVENOUS; SOFT TISSUE at 08:09

## 2024-09-18 RX ADMIN — MIDAZOLAM HYDROCHLORIDE 2 MG: 1 INJECTION, SOLUTION INTRAMUSCULAR; INTRAVENOUS at 08:09

## 2024-09-18 RX ADMIN — LIDOCAINE HYDROCHLORIDE 50 MG: 20 INJECTION, SOLUTION EPIDURAL; INFILTRATION; INTRACAUDAL; PERINEURAL at 08:09

## 2024-09-18 RX ADMIN — SODIUM CHLORIDE, POTASSIUM CHLORIDE, SODIUM LACTATE AND CALCIUM CHLORIDE: 600; 310; 30; 20 INJECTION, SOLUTION INTRAVENOUS at 07:09

## 2024-09-18 NOTE — DISCHARGE SUMMARY
[facility] - Surgery  Discharge Note  Short Stay    Procedure(s) (LRB):  EXCISION, SUPERFICIAL MASS, HEAD (Right)      OUTCOME: Patient tolerated treatment/procedure well without complication and is now ready for discharge.    DISPOSITION: Home or Self Care    FINAL DIAGNOSIS:  Scalp cyst    FOLLOWUP: In clinic in 2 weeks    DISCHARGE INSTRUCTIONS:  No discharge procedures on file.     TIME SPENT ON DISCHARGE:  10 minutes

## 2024-09-18 NOTE — ANESTHESIA PROCEDURE NOTES
Intubation    Date/Time: 9/18/2024 8:25 AM    Performed by: Raheem Light CRNA  Authorized by: Darwin Walker MD    Intubation:     Induction:  Rapid sequence induction    Intubated:  Postinduction    Mask Ventilation:  Easy mask    Attempts:  1    Attempted By:  CRNA    Method of Intubation:  Video laryngoscopy    Blade:  Szymanski 3    Laryngeal View Grade: Grade I - full view of cords      Difficult Airway Encountered?: No      Complications:  None    Airway Device:  Oral endotracheal tube    Airway Device Size:  7.0    Tube secured:  22    Secured at:  The teeth    Placement Verified By:  Capnometry    Complicating Factors:  None    Findings Post-Intubation:  BS equal bilateral

## 2024-09-18 NOTE — LETTER
"September 18, 2024          Patient: Josesito Moore (Alexis)   YOB: 2000   Date of Visit: 9/18/2024    Department Information: [facility] - SURGERY  [address] [place] [address]  [phone]                 To Whom It May Concern:    Josesito was seen today at our facility. Please excuse her from work today September 18,2024. If there are any questions please do not hesitate to call.     Thank you,   MD Ayaka Slainas RN  "

## 2024-09-18 NOTE — OP NOTE
Patient: Josesito Moore     Date of Procedure: 9/18/2024    Procedure:  Excision cyst right scalp    Surgeon: Siva Gunter MD    Assistant: None    Pre-op Diagnosis: Cyst, dermoid, scalp and neck [D23.4]     Post-op Diagnosis: Cyst, dermoid, scalp and neck [D23.4] measuring a proximally 1-1/2 cm in diameter    Procedure in Detail:  After informed consent was obtained, consent form signed, and questions answered, the surgical site was identified and marked appropriately.  The patient was then taken to the operating room where general anesthesia was induced. Prophylactic IV antibiotics were administered.  The patient was positioned and the surgical field was prepped and draped in the usual sterile fashion. A thorough time-out procedure was performed with the surgical team.    2 cm incision was made over the cystic mass taken down the skin subcutaneous tissue aforementioned cyst was identified and excised in its entirety.  It was sent for pathologic evaluation.  Hemostasis was achieved in the wound with cautery and it was irrigated with saline and closed with interrupted 3-0 nylon the skin level.  Sterile dressings were applied and patient was brought to the recovery room, extubated in hemodynamically stable condition.    Dressings were applied and the patient was awakened and transferred to the recovery room in stable condition. There were no immediate complications.    Specimen:  Sent for pathologic evaluation    EBL:  Less than 2 cc    Complications: None    This note was created using [employer] direct voice recognition software. Note may have occasional typographical errors that may not have been identified and edited despite initial review prior to signing.

## 2024-09-18 NOTE — ANESTHESIA POSTPROCEDURE EVALUATION
Anesthesia Post Evaluation    Patient: Josesito Moore    Procedure(s) Performed: Procedure(s) (LRB):  EXCISION, SUPERFICIAL MASS, HEAD (Right)    Final Anesthesia Type: general      Patient location during evaluation: PACU  Patient participation: Yes- Able to Participate  Level of consciousness: awake and alert and oriented  Post-procedure vital signs: reviewed and stable  Pain management: adequate  Airway patency: patent    PONV status at discharge: No PONV  Anesthetic complications: no      Cardiovascular status: blood pressure returned to baseline and hemodynamically stable  Respiratory status: spontaneous ventilation and room air  Hydration status: euvolemic  Follow-up not needed.          Vitals Value Taken Time   /64 09/18/24 0921   Temp 36.5 °C (97.7 °F) 09/18/24 0907   Pulse 90 09/18/24 0921   Resp 19 09/18/24 0921   SpO2 98 % 09/18/24 0921   Vitals shown include unfiled device data.      No case tracking events are documented in the log.      Pain/Rufino Score: Rufino Score: 9 (9/18/2024  9:15 AM)

## 2024-09-20 LAB
FINAL PATHOLOGIC DIAGNOSIS: NORMAL
GROSS: NORMAL
Lab: NORMAL

## 2024-09-27 ENCOUNTER — OFFICE VISIT (OUTPATIENT)
Dept: SURGERY | Facility: CLINIC | Age: 24
End: 2024-09-27
Payer: MEDICAID

## 2024-09-27 VITALS
OXYGEN SATURATION: 97 % | SYSTOLIC BLOOD PRESSURE: 122 MMHG | BODY MASS INDEX: 43.4 KG/M2 | HEIGHT: 69 IN | TEMPERATURE: 98 F | DIASTOLIC BLOOD PRESSURE: 80 MMHG | WEIGHT: 293 LBS | HEART RATE: 81 BPM

## 2024-09-27 DIAGNOSIS — D23.4 CYST, DERMOID, SCALP AND NECK: Primary | ICD-10-CM

## 2024-09-27 PROCEDURE — 99999 PR PBB SHADOW E&M-EST. PATIENT-LVL III: CPT | Mod: PBBFAC,,, | Performed by: SPECIALIST

## 2024-09-27 PROCEDURE — 99213 OFFICE O/P EST LOW 20 MIN: CPT | Mod: PBBFAC | Performed by: SPECIALIST

## 2024-09-27 NOTE — PROGRESS NOTES
"Patient is a 24 y.o. female who presents for postoperative evaluation following  scalp cyst benign    Chief Complaint   Patient presents with    Post-op Evaluation     Superficial mass excision on head DOS:  09/18/2024        Vitals:    09/27/24 1044 09/27/24 1045   BP: (!) 148/89 122/80   BP Location: Left arm Left arm   Patient Position: Sitting Sitting   BP Method: Medium (Automatic) Medium (Manual)   Pulse: 81    Temp: 98.2 °F (36.8 °C)    TempSrc: Oral    SpO2: 97%    Weight: (!) 137 kg (302 lb 0.5 oz)    Height: 5' 9" (1.753 m)         Subjective      Physical Exam     Incision clean dry and intact healing nicely    Drains none    Pathology thoroughly discussed    Assessment & Plan     Healing well following excision cysts scalp  Recommendations return to clinic in 1 week for suture removal    No orders of the defined types were placed in this encounter.       Follow-up 1 week    Siva Gunter MD  General Surgery  [address]   [facility] [place] [address]      Patient instructed that best way to communicate with my office staff is for patient to get on the [facility] epic patient portal to expedite communication and communication issues that may occur.  Patient was given instructions on how to get on the portal.  I encouraged patient to obtain portal access as well.  Ultimately it is up to the patient to obtain access.  Patient voiced understanding.    "

## 2024-10-29 ENCOUNTER — HOSPITAL ENCOUNTER (EMERGENCY)
Facility: HOSPITAL | Age: 24
Discharge: HOME | End: 2024-10-29
Attending: STUDENT IN AN ORGANIZED HEALTH CARE EDUCATION/TRAINING PROGRAM
Payer: MEDICAID

## 2024-10-29 VITALS
RESPIRATION RATE: 18 BRPM | HEART RATE: 72 BPM | OXYGEN SATURATION: 100 % | DIASTOLIC BLOOD PRESSURE: 90 MMHG | HEIGHT: 69 IN | BODY MASS INDEX: 43.4 KG/M2 | TEMPERATURE: 98 F | WEIGHT: 293 LBS | SYSTOLIC BLOOD PRESSURE: 137 MMHG

## 2024-10-29 DIAGNOSIS — M79.669 CALF PAIN: ICD-10-CM

## 2024-10-29 DIAGNOSIS — R00.2 PALPITATIONS: ICD-10-CM

## 2024-10-29 DIAGNOSIS — F41.9 ANXIETY: Primary | ICD-10-CM

## 2024-10-29 LAB
ALBUMIN SERPL BCP-MCNC: 4 G/DL (ref 3.5–5.2)
ALP SERPL-CCNC: 64 U/L (ref 40–150)
ALT SERPL W/O P-5'-P-CCNC: 19 U/L (ref 10–44)
ANION GAP SERPL CALC-SCNC: 11 MMOL/L (ref 8–16)
APTT PPP: 27.3 SEC (ref 21–32)
AST SERPL-CCNC: 22 U/L (ref 10–40)
B-HCG UR QL: NEGATIVE
BASOPHILS # BLD AUTO: 0.04 K/UL (ref 0–0.2)
BASOPHILS NFR BLD: 0.4 % (ref 0–1.9)
BILIRUB SERPL-MCNC: 0.4 MG/DL (ref 0.1–1)
BUN SERPL-MCNC: 12 MG/DL (ref 6–20)
CALCIUM SERPL-MCNC: 9.4 MG/DL (ref 8.7–10.5)
CHLORIDE SERPL-SCNC: 108 MMOL/L (ref 95–110)
CO2 SERPL-SCNC: 23 MMOL/L (ref 23–29)
CREAT SERPL-MCNC: 0.8 MG/DL (ref 0.5–1.4)
D DIMER PPP IA.FEU-MCNC: 0.9 MG/L FEU
DIFFERENTIAL METHOD BLD: NORMAL
EOSINOPHIL # BLD AUTO: 0.1 K/UL (ref 0–0.5)
EOSINOPHIL NFR BLD: 0.5 % (ref 0–8)
ERYTHROCYTE [DISTWIDTH] IN BLOOD BY AUTOMATED COUNT: 12.4 % (ref 11.5–14.5)
EST. GFR  (NO RACE VARIABLE): >60 ML/MIN/1.73 M^2
GLUCOSE SERPL-MCNC: 89 MG/DL (ref 70–110)
HCT VFR BLD AUTO: 43 % (ref 37–48.5)
HGB BLD-MCNC: 14.3 G/DL (ref 12–16)
IMM GRANULOCYTES # BLD AUTO: 0.03 K/UL (ref 0–0.04)
IMM GRANULOCYTES NFR BLD AUTO: 0.3 % (ref 0–0.5)
INR PPP: 1 (ref 0.8–1.2)
LYMPHOCYTES # BLD AUTO: 3 K/UL (ref 1–4.8)
LYMPHOCYTES NFR BLD: 26.9 % (ref 18–48)
MCH RBC QN AUTO: 28.9 PG (ref 27–31)
MCHC RBC AUTO-ENTMCNC: 33.3 G/DL (ref 32–36)
MCV RBC AUTO: 87 FL (ref 82–98)
MONOCYTES # BLD AUTO: 0.8 K/UL (ref 0.3–1)
MONOCYTES NFR BLD: 7.5 % (ref 4–15)
NEUTROPHILS # BLD AUTO: 7.1 K/UL (ref 1.8–7.7)
NEUTROPHILS NFR BLD: 64.4 % (ref 38–73)
NRBC BLD-RTO: 0 /100 WBC
PLATELET # BLD AUTO: 294 K/UL (ref 150–450)
PMV BLD AUTO: 9.6 FL (ref 9.2–12.9)
POTASSIUM SERPL-SCNC: 3.9 MMOL/L (ref 3.5–5.1)
PROT SERPL-MCNC: 8 G/DL (ref 6–8.4)
PROTHROMBIN TIME: 10.5 SEC (ref 9–12.5)
RBC # BLD AUTO: 4.94 M/UL (ref 4–5.4)
SODIUM SERPL-SCNC: 142 MMOL/L (ref 136–145)
TROPONIN I SERPL DL<=0.01 NG/ML-MCNC: <0.006 NG/ML (ref 0–0.03)
WBC # BLD AUTO: 11.02 K/UL (ref 3.9–12.7)

## 2024-10-29 PROCEDURE — 93971 EXTREMITY STUDY: CPT | Mod: TC,LT

## 2024-10-29 PROCEDURE — 85610 PROTHROMBIN TIME: CPT | Performed by: STUDENT IN AN ORGANIZED HEALTH CARE EDUCATION/TRAINING PROGRAM

## 2024-10-29 PROCEDURE — 71275 CT ANGIOGRAPHY CHEST: CPT | Mod: 26,,, | Performed by: RADIOLOGY

## 2024-10-29 PROCEDURE — 85379 FIBRIN DEGRADATION QUANT: CPT | Performed by: STUDENT IN AN ORGANIZED HEALTH CARE EDUCATION/TRAINING PROGRAM

## 2024-10-29 PROCEDURE — 71275 CT ANGIOGRAPHY CHEST: CPT | Mod: TC

## 2024-10-29 PROCEDURE — 80053 COMPREHEN METABOLIC PANEL: CPT | Performed by: STUDENT IN AN ORGANIZED HEALTH CARE EDUCATION/TRAINING PROGRAM

## 2024-10-29 PROCEDURE — 84484 ASSAY OF TROPONIN QUANT: CPT | Performed by: STUDENT IN AN ORGANIZED HEALTH CARE EDUCATION/TRAINING PROGRAM

## 2024-10-29 PROCEDURE — 25500020 PHARM REV CODE 255: Performed by: STUDENT IN AN ORGANIZED HEALTH CARE EDUCATION/TRAINING PROGRAM

## 2024-10-29 PROCEDURE — 86803 HEPATITIS C AB TEST: CPT | Performed by: EMERGENCY MEDICINE

## 2024-10-29 PROCEDURE — 81025 URINE PREGNANCY TEST: CPT | Performed by: STUDENT IN AN ORGANIZED HEALTH CARE EDUCATION/TRAINING PROGRAM

## 2024-10-29 PROCEDURE — 85025 COMPLETE CBC W/AUTO DIFF WBC: CPT | Performed by: STUDENT IN AN ORGANIZED HEALTH CARE EDUCATION/TRAINING PROGRAM

## 2024-10-29 PROCEDURE — 85730 THROMBOPLASTIN TIME PARTIAL: CPT | Performed by: STUDENT IN AN ORGANIZED HEALTH CARE EDUCATION/TRAINING PROGRAM

## 2024-10-29 PROCEDURE — 99285 EMERGENCY DEPT VISIT HI MDM: CPT | Mod: 25

## 2024-10-29 PROCEDURE — 93005 ELECTROCARDIOGRAM TRACING: CPT

## 2024-10-29 PROCEDURE — 93010 ELECTROCARDIOGRAM REPORT: CPT | Mod: ,,, | Performed by: INTERNAL MEDICINE

## 2024-10-29 PROCEDURE — 93971 EXTREMITY STUDY: CPT | Mod: 26,LT,, | Performed by: RADIOLOGY

## 2024-10-29 RX ORDER — HYDROXYZINE HYDROCHLORIDE 25 MG/1
25 TABLET, FILM COATED ORAL EVERY 6 HOURS PRN
Qty: 20 TABLET | Refills: 0 | Status: SHIPPED | OUTPATIENT
Start: 2024-10-29

## 2024-10-29 RX ADMIN — IOHEXOL 100 ML: 350 INJECTION, SOLUTION INTRAVENOUS at 09:10

## 2024-10-30 LAB
HCV AB SERPL QL IA: NORMAL
OHS QRS DURATION: 70 MS
OHS QTC CALCULATION: 452 MS

## 2025-05-21 ENCOUNTER — TELEPHONE (OUTPATIENT)
Dept: ORTHOPEDICS | Facility: CLINIC | Age: 25
End: 2025-05-21
Payer: MEDICAID

## 2025-05-21 DIAGNOSIS — S69.91XA INJURY OF RIGHT HAND, INITIAL ENCOUNTER: Primary | ICD-10-CM

## 2025-05-21 NOTE — TELEPHONE ENCOUNTER
Called pt - LVM to discuss appointment scheduled for 05/22/2025 in [place].   Appointment reason states the following:     fx right hand, pt was told by [facility] she needed to see Dr. Yang for surgery       ED note from [facility] states :     FOLLOW UP  The patient has been instructed to:     Derek Burch MD .   Specialty: Hand Surgery  Contact information  [address] [place] [address] [phone]      Xray report available, cannot see imaging. Pt can be seen however we need to see imaging. Pt will need to bring a disc or have new X-rays completed 15 minutes prior to appointment time.     Portal message sent as well.

## 2025-05-22 ENCOUNTER — TELEPHONE (OUTPATIENT)
Dept: ORTHOPEDICS | Facility: CLINIC | Age: 25
End: 2025-05-22

## 2025-05-22 ENCOUNTER — OFFICE VISIT (OUTPATIENT)
Dept: ORTHOPEDICS | Facility: CLINIC | Age: 25
End: 2025-05-22
Payer: MEDICAID

## 2025-05-22 ENCOUNTER — HOSPITAL ENCOUNTER (OUTPATIENT)
Dept: RADIOLOGY | Facility: HOSPITAL | Age: 25
Discharge: HOME | End: 2025-05-22
Attending: ORTHOPAEDIC SURGERY
Payer: MEDICAID

## 2025-05-22 VITALS — BODY MASS INDEX: 43.4 KG/M2 | WEIGHT: 293 LBS | HEIGHT: 69 IN

## 2025-05-22 DIAGNOSIS — S62.306A CLOSED FRACTURE OF FIFTH METACARPAL BONE OF RIGHT HAND, UNSPECIFIED FRACTURE MORPHOLOGY, INITIAL ENCOUNTER: Primary | ICD-10-CM

## 2025-05-22 DIAGNOSIS — S69.91XA INJURY OF RIGHT HAND, INITIAL ENCOUNTER: Primary | ICD-10-CM

## 2025-05-22 DIAGNOSIS — S69.91XA INJURY OF RIGHT HAND, INITIAL ENCOUNTER: ICD-10-CM

## 2025-05-22 PROCEDURE — 1160F RVW MEDS BY RX/DR IN RCRD: CPT | Mod: CPTII,,, | Performed by: ORTHOPAEDIC SURGERY

## 2025-05-22 PROCEDURE — 99999 PR PBB SHADOW E&M-EST. PATIENT-LVL III: CPT | Mod: PBBFAC,,, | Performed by: ORTHOPAEDIC SURGERY

## 2025-05-22 PROCEDURE — 73130 X-RAY EXAM OF HAND: CPT | Mod: TC,PN,RT

## 2025-05-22 PROCEDURE — 3008F BODY MASS INDEX DOCD: CPT | Mod: CPTII,,, | Performed by: ORTHOPAEDIC SURGERY

## 2025-05-22 PROCEDURE — 99213 OFFICE O/P EST LOW 20 MIN: CPT | Mod: PBBFAC,25,PN | Performed by: ORTHOPAEDIC SURGERY

## 2025-05-22 PROCEDURE — 73130 X-RAY EXAM OF HAND: CPT | Mod: 26,RT,, | Performed by: RADIOLOGY

## 2025-05-22 PROCEDURE — 99205 OFFICE O/P NEW HI 60 MIN: CPT | Mod: S$PBB,,, | Performed by: ORTHOPAEDIC SURGERY

## 2025-05-22 PROCEDURE — 1159F MED LIST DOCD IN RCRD: CPT | Mod: CPTII,,, | Performed by: ORTHOPAEDIC SURGERY

## 2025-05-22 NOTE — PROGRESS NOTES
Subjective:      Patient ID: Josesito Moore is a 24 y.o. female.    Chief Complaint: Pain and Injury of the Right Hand (DOI 5/16/2025, hit tree)    HPI  24-year-old female with a one-week history of right hand pain.  She sustained blunt trauma when she struck a tree and anger.  Was seen in [facility] emergency department diagnosed with a metacarpal fracture splinted and referred to a hand specialists in [place].  That is some point was told that she needed to see us for possible hand surgery.  She is right-hand dominant and works as a  [employer] doing an active job frequently quite requiring repetitive use and heavy lifting.  ROS      Objective:    Ortho Exam     Constitutional:   Patient is alert  and oriented in no acute distress  HEENT:  normocephalic atraumatic; PERRL EOMI  Neck:  Supple without adenopathy  Cardiovascular:  Normal rate and rhythm  Pulmonary:  Normal respiratory effort normal chest wall expansion  Abdominal:  Nonprotuberant nondistended  Musculoskeletal:  Mild swelling diffuse tenderness and generalized stiffness of the right-hand  Intact skin, sensation, and brisk capillary refill of the digits  Neurological:  No focal defect; cranial nerves 2-12 grossly intact  Psychiatric/behavioral:  Mood and behavior normal           My Radiographs Findings:    Radiographs consistent with mid shaft 5th metacarpal fracture with some comminution no shortening or angulation only minimal displacement  Assessment:       Encounter Diagnosis   Name Primary?    Closed fracture of fifth metacarpal bone of right hand, unspecified fracture morphology, initial encounter Yes         Plan:       I have discussed medical condition treatment options with the her at length.  I have tear of the told her I see no indication for surgical intervention.  I have discussed ulnar gutter splinting activity restrictions ice elevation compressive wrapping and beginning digit range of motion  exercises.  Follow up radiographs in 2 weeks sooner if any questions or problems        Past Medical History:   Diagnosis Date    ADHD (attention deficit hyperactivity disorder)     Depression     Dermoid cyst of scalp     Dizzy     Enlarged heart     Heart murmur of      Obesity      Past Surgical History:   Procedure Laterality Date    Excision of cyst of scalp  2018    EXCISION, SUPERFICIAL MASS, HEAD Right 2024    Procedure: EXCISION, SUPERFICIAL MASS, HEAD;  Surgeon: Siva Gunter MD;  Location: [facility];  Service: General;  Laterality: Right;       Current Medications[1]    Review of patient's allergies indicates:  No Known Allergies    Family History   Problem Relation Name Age of Onset    Heart disease Mother      Heart disease Maternal Aunt      Heart disease Maternal Uncle      Heart disease Maternal Grandmother      Diabetes Maternal Grandmother      Heart disease Maternal Grandfather      Diabetes Paternal Grandfather       Social History     Occupational History    Not on file   Tobacco Use    Smoking status: Never    Smokeless tobacco: Never   Substance and Sexual Activity    Alcohol use: No    Drug use: No    Sexual activity: Yes     Partners: Female            [1]   Current Outpatient Medications:     desogestreL-ethinyl estradioL (APRI) 0.15-0.03 mg per tablet, Take 1 tablet by mouth once daily., Disp: 28 tablet, Rfl: 11    hydrOXYzine HCL (ATARAX) 25 MG tablet, Take 1 tablet (25 mg total) by mouth every 6 (six) hours as needed for Anxiety., Disp: 20 tablet, Rfl: 0

## 2025-06-05 ENCOUNTER — OFFICE VISIT (OUTPATIENT)
Dept: ORTHOPEDICS | Facility: CLINIC | Age: 25
End: 2025-06-05
Payer: MEDICAID

## 2025-06-05 ENCOUNTER — HOSPITAL ENCOUNTER (OUTPATIENT)
Dept: RADIOLOGY | Facility: HOSPITAL | Age: 25
Discharge: HOME | End: 2025-06-05
Attending: ORTHOPAEDIC SURGERY
Payer: MEDICAID

## 2025-06-05 VITALS — WEIGHT: 293 LBS | BODY MASS INDEX: 43.4 KG/M2 | HEIGHT: 69 IN

## 2025-06-05 DIAGNOSIS — S62.306D CLOSED NONDISPLACED FRACTURE OF FIFTH METACARPAL BONE OF RIGHT HAND WITH ROUTINE HEALING, UNSPECIFIED PORTION OF METACARPAL, SUBSEQUENT ENCOUNTER: Primary | ICD-10-CM

## 2025-06-05 DIAGNOSIS — S69.91XA INJURY OF RIGHT HAND, INITIAL ENCOUNTER: ICD-10-CM

## 2025-06-05 PROCEDURE — 73130 X-RAY EXAM OF HAND: CPT | Mod: TC,PN,RT

## 2025-06-05 PROCEDURE — 73130 X-RAY EXAM OF HAND: CPT | Mod: 26,RT,, | Performed by: RADIOLOGY

## 2025-06-05 PROCEDURE — 99999 PR PBB SHADOW E&M-EST. PATIENT-LVL II: CPT | Mod: PBBFAC,,, | Performed by: ORTHOPAEDIC SURGERY

## 2025-06-05 PROCEDURE — 99212 OFFICE O/P EST SF 10 MIN: CPT | Mod: PBBFAC,25,PN | Performed by: ORTHOPAEDIC SURGERY

## 2025-06-13 DIAGNOSIS — S62.306A UNSPECIFIED FRACTURE OF FIFTH METACARPAL BONE, RIGHT HAND, INITIAL ENCOUNTER FOR CLOSED FRACTURE: Primary | ICD-10-CM

## 2025-07-09 ENCOUNTER — CLINICAL SUPPORT (OUTPATIENT)
Dept: REHABILITATION | Facility: HOSPITAL | Age: 25
End: 2025-07-09
Payer: COMMERCIAL

## 2025-07-09 DIAGNOSIS — R20.1 IMPAIRED SENSATION: ICD-10-CM

## 2025-07-09 DIAGNOSIS — R29.898 DECREASED GRIP STRENGTH OF RIGHT HAND: Primary | ICD-10-CM

## 2025-07-09 PROCEDURE — 97110 THERAPEUTIC EXERCISES: CPT | Mod: PN

## 2025-07-09 PROCEDURE — 97165 OT EVAL LOW COMPLEX 30 MIN: CPT | Mod: PN

## 2025-07-09 NOTE — PROGRESS NOTES
Outpatient Rehab    Occupational Therapy Evaluation    Patient Name: Josesito Moore  MRN: [id number]  YOB: 2000  Encounter Date: 7/9/2025    Therapy Diagnosis:   Encounter Diagnoses   Name Primary?    Decreased  strength of right hand Yes    Impaired sensation      Physician: Yara Coburn MD    Physician Orders: Eval and Treat  Medical Diagnosis: Unspecified fracture of fifth metacarpal bone, right hand, initial encounter for closed fracture  Surgical Diagnosis: open reduction internal fixation right fifth metacarpal shaft fracture   Surgical Date: 6/12/2025  Days Since Last Surgery: 27    Visit # / Visits Authorized: 1 / 1  Insurance Authorization Period: 6/13/2025 to 6/13/2026  Date of Evaluation: 7/9/2025  Plan of Care Certification: 7/9/2025 to 10/9/25     Time In: 1359   Time Out: 1453  Total Time (in minutes): 54   Total Billable Time (in minutes): 54    Intake Outcome Measure for FOTO Survey    Therapist reviewed FOTO scores for Josesito Moore on 7/9/2025.   FOTO report - see Media section or FOTO account episode details.     Intake Score: 73%    Precautions:  Strengthening Precautions: o heavy lifting, pulling, or pushing with the RUE. Light work duty only.         Subjective   History of Present Illness  Josesito is a 25 y.o. female who reports to occupational therapy with a chief concern of s/p open reduction internal fixation right fifth metacarpal shaft fracture.     The patient reports a medical diagnosis of S62.306A (ICD-10-CM) - Unspecified fracture of fifth metacarpal bone, right hand, initial encounter for closed fracture.  Patient reports a surgery of open reduction internal fixation right fifth metacarpal shaft fracture. Surgery occurred on 06/12/25. Diagnostic tests related to this condition: X-ray.   X-Ray Details: Normal alignment of all visualized joints.  Acute mildly comminuted   mildly displaced fracture involves the mid to distal diaphysis right   5th metacarpal bone with  some adjacent soft tissue swelling.  The other   visualized bones appear normal.  No arthritic changes.  No radiopaque   foreign body.    Dominant Hand: Right  History of Present Condition/Illness: Pt sustained blunt trauma to her right hand after punching a tree out of anger on 5/17/25. Pt presented to the ED with an acute mildly comminuted mildly displaced fracture involves the mid to distal diaphysis right 5th metacarpal bone with some adjacent soft tissue swelling. She proceeded with conservative treatment of splinting until seen by a hand specialist. She proceeded with an open reduction internal fixation right fifth metacarpal shaft fracture 6/12/25. She was placed into a removable short arm velco brace, but she has only been wearing it while at work.      Activities of Daily Living  Social history was obtained from Patient.    General Prior Level of Function Comments: independent  General Current Level of Function Comments: independent  Patient Roles: Caregiver for pet  Patient Responsibilities: Community mobility, Driving, Financial management, Health management, Home management, Laundry, Personal ADL, Shopping, Meal prep    Previously independent with activities of daily living? Yes     Currently independent with activities of daily living? Yes     Patient is primarily using her left hand to perform most ADLs as she is limited in some ability to pull clothing into place using her right hand small and ring fingers.    Previously independent with instrumental activities of daily living? Yes     Currently independent with instrumental activities of daily living? Yes     Patient reported some shaking while handwriting while resting her ulna on the table. Patient is driving only left handed, but she did this prior to injury. She is compensating in all IADLs with her left hand.        Pain     Patient reports a current pain level of 0/10. Pain at best is reported as 0/10. Pain at worst is reported as 8/10.    Location: right 5th metacarpal into her elbow  Clinical Progression (since onset): Stable  Pain Qualities: Aching, Dull, Other (Comment)  Other Pain Qualities: numbness  Pain-Relieving Factors: Ice  Pain-Aggravating Factors: Holding objects, Other (Comment)  Other Pain-Aggravating Factors: grasping         Treatment History  Treatments  Previously Received Treatments: No  Currently Receiving Treatments: No    Living Arrangements  Living Situation  Housing: Home independently  Living Arrangements: Spouse/significant other  Support Systems: Family members, Friends/neighbors, Spouse/significant other        Employment  Patient reports: Does the patient's condition impact their ability to work?  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      Past Medical History/Physical Systems Review:   Josesito Moore  has a past medical history of ADHD (attention deficit hyperactivity disorder), Depression, Dermoid cyst of scalp, Dizzy, Enlarged heart, Heart murmur of , and Obesity.    Josesito Moore  has a past surgical history that includes Excision of cyst of scalp (2018) and excision, superficial mass, head (Right, 2024).    Josesito has a current medication list which includes the following prescription(s): desogestrel-ethinyl estradiol and hydroxyzine hcl.    Review of patient's allergies indicates:  No Known Allergies     Objective   Wrist/Hand Observations  Right General Wrist/Hand Problems  Present: Adhesive Scar         Upper Extremity Sensation  General Upper Extremity Sensation  Impaired: Right  Right Upper Extremity Sensation  Diminished: Light Touch, Sharp/Dull Discrimination, and Proprioception            Impaired in the ulnar innervation of the right hand in the ring and small fingers, along 5th metacarpal        Elbow/Forearm Range of Motion   Right Elbow/Forearm   Active (deg) Passive  (deg) Pain   Flexion         Extension         Forearm Pronation 90       Forearm Supination 80         Left Elbow/Forearm   Active (deg) Passive (deg) Pain   Flexion         Extension         Forearm Pronation 90       Forearm Supination 90                Wrist Range of Motion  Right Wrist   Active (deg) Passive (deg) Pain Comment   Flexion 90         Extension 70         Radial Deviation 20         Ulnar Deviation 30           Left Wrist   Active (deg) Passive (deg) Pain Comment   Flexion 90         Extension 70         Radial Deviation 20         Ulnar Deviation 30                     Adequate AROM: Right Hand and Left Hand                 Forearm Strength   Right Strength Right Pain Left Strength Left  Pain   Pronation 4+   5     Supination 4+   5         Wrist Strength - Planes of Motion   Right Strength Right Pain Left Strength Left  Pain   Flexion 4+   5     Extension 4+   5     Radial Deviation 4+   5     Ulnar Deviation (C8) 4+   5       Left  Strength  Left Hand Dynamometer Position: 2  Elbow Position Forearm Position Trial 1 (lbs) Trial 2 (lbs) Trial 3 (lbs) Average (lbs) Pain   Flexed Neutral 88 89 94 90.33         Right Pinch Strength   Trial 1 (lbs) Trial 2 (lbs) Trial 3 (lbs) Average (lbs) Pain   Lateral (Key Pinch) 12 12 10 11.33     Three Point (Three Jaw Jerry) 9 11 10 10     Two Point (Tip to Tip) 8 8 7 7.67         Left Pinch Strength   Trial 1 (lbs) Trial 2 (lbs) Trial 3 (lbs) Average (lbs) Pain   Lateral (Key Pinch) 13 13 12 12.67     Three Point (Three Jaw Jerry) 15 13 15 14.33     Two Point (Tip to Tip) 8 8 9 8.33           /Pinch Details  Right  not tested secondary to postoperative status        Wrist/Hand Special Tests  Positive: Right Ulnar Tinel's  Hand Coordination Special Tests  Left In Hand Manipulation: Intact  Purdue Peg Test: R 14, L 13, B 18, assembly 6    Coordination  Coordination Tests  Intact: Left In Hand Manipulation                   Treatment:  Therapeutic  Exercise  TE 1: flexor tendon glides, 15  TE 2: wrist flexion with forearm supported, 3x10; wrist extension with forearm supported, 3x10; forearm pronation to supination, 3x10; wrist radial to ulnar deviation, 3x10  Manual Therapy  MT 1: scar massage to right 5th metacarpal shaft incision to reduce hypersenstivity and scar tissue adhesions    Time Entry(in minutes):  OT Evaluation (Low) Time Entry: 41  Manual Therapy Time Entry: 5  Therapeutic Exercise Time Entry: 8    Assessment & Plan   Assessment  Josesito presents with a condition of Low complexity.   Presentation of Symptoms: Stable  Will Comorbidities Impact Care: No       ADL Limitations : Dressing  IADL Limitations: Care of pets, Grocery/shopping, Health management and maintenance, Home establishment and management, Meal preparation and cleanup, Safety and emergency maintenance  Work Limitations: Job performance  Leisure Limitations: Leisure participation  Functional Limitations: Activity tolerance, Carrying objects, Fine motor coordination, Manipulating objects, Pain with ADLs/IADLs, Proprioception                 Evaluation/Treatment Response: Patient responded to treatment well  Patient Goal for Therapy (OT): to gain strength needed to return to work  Prognosis: Good  Assessment Details: Patient presented s/p open reduction internal fixation of the right fifth metacarpal shaft fracture. Patient has been diligently moving her right hand and wrist, which has reduced her joint stiffness and enabled wrist and hand range of motion within normal limits. Patient reported decreased wrist and  strength due to injury and limitation of use postoperatively. She also reported numbness in the ulnar innervation of the right hand and digits, indicating some nerve impingement with injury that will hopefully resolve as she heals. Patient would benefit from skilled OT services to progress functional grasp and pinch strength of the right hand needed to perform ADL/IADL  without difficulty. Patient demonstrated good understanding of role and progression of rehab, and she demonstrated independence in home exercise program.    Plan  From an occupational therapy perspective, the patient would benefit from: Skilled Rehab Services    Planned therapy interventions include: Therapeutic exercise, Therapeutic activities, Manual therapy, Neuromuscular re-education, ADLs/IADLs, and Orthotic management and training.    Planned modalities to include: Paraffin bath, Ultrasound, Thermotherapy (hot pack), and Cryotherapy (cold pack).        Visit Frequency: 2 times Per Week for 6 Weeks.       This plan was discussed with Patient.   Discussion participants: Agreed Upon Plan of Care             The patient's spiritual, cultural, and educational needs were considered, and the patient is agreeable to the plan of care and goals.     Education  Education was done with Patient. The patient's learning style includes Demonstration, Listening, and Pictures/video. The patient Verbalizes understanding and Demonstrates understanding.                 Goals:   Active       long-term       patient will demonstrate right hand  and pinch strength WNL needed to perform ADL/IADL tasks independently without difficulty.       Start:  07/09/25    Expected End:  08/27/25            patient will demonstrate        Start:  07/09/25            patient will demonstrate independence in progressive strengthening home exercise program upon discharge.       Start:  07/09/25    Expected End:  08/27/25               short-term       Patient to be independent with home exercise program as issued by Occupational Therapist, noted through return demonstration to increase clinical carry over.        Start:  07/09/25    Expected End:  08/06/25            patient will implement use of AE including dycem to enhance functional grasp strength of the right hand.       Start:  07/09/25    Expected End:  08/06/25            patient will  report less guarding behaviors as evidenced by active use of the RUE in ADLs such as dressing and IADLs such as driving.       Start:  07/09/25    Expected End:  08/06/25                Christina Swan, OT

## 2025-07-09 NOTE — LETTER
July 9, 2025  Yara Coburn MD  [address] [facility] [address]  [place] [address]      To whom it may concern,     The attached plan of care is being sent to you for review and reference.    You may indicate your approval by signing the document electronically, or by faxing/mailing a signed copy of the final page of this document back to the attention of Christina Swan OT:         Plan of Care 7/9/25   Effective from: 7/9/2025  Effective to: 10/9/2025    Plan ID: [id number]            Participants as of Finalize on 7/9/2025    Name Type Comments Contact Info    Yara Coburn MD Referring Provider  [phone]       Last Plan Note     Author: Christina Swan OT Status: Signed Last edited: 7/9/2025  2:30 PM         Outpatient Rehab    Occupational Therapy Evaluation    Patient Name: Josesito Moore  MRN: [id number]  YOB: 2000  Encounter Date: 7/9/2025    Therapy Diagnosis:   Encounter Diagnoses   Name Primary?    Decreased  strength of right hand Yes    Impaired sensation      Physician: Yara Coburn MD    Physician Orders: Eval and Treat  Medical Diagnosis: Unspecified fracture of fifth metacarpal bone, right hand, initial encounter for closed fracture  Surgical Diagnosis: open reduction internal fixation right fifth metacarpal shaft fracture   Surgical Date: 6/12/2025  Days Since Last Surgery: 27    Visit # / Visits Authorized: 1 / 1  Insurance Authorization Period: 6/13/2025 to 6/13/2026  Date of Evaluation: 7/9/2025  Plan of Care Certification: 7/9/2025 to 10/9/25     Time In: 1359   Time Out: 1453  Total Time (in minutes): 54   Total Billable Time (in minutes): 54    Intake Outcome Measure for FOTO Survey    Therapist reviewed FOTO scores for Josesito Moore on 7/9/2025.   FOTO report - see Media section or FOTO account episode details.     Intake Score: 73%    Precautions:  Strengthening Precautions: o heavy lifting, pulling, or pushing with the RUE. Light work duty only.         Subjective    History of Present Illness  Josesito is a 25 y.o. female who reports to occupational therapy with a chief concern of s/p open reduction internal fixation right fifth metacarpal shaft fracture.     The patient reports a medical diagnosis of S62.306A (ICD-10-CM) - Unspecified fracture of fifth metacarpal bone, right hand, initial encounter for closed fracture.  Patient reports a surgery of open reduction internal fixation right fifth metacarpal shaft fracture. Surgery occurred on 06/12/25. Diagnostic tests related to this condition: X-ray.   X-Ray Details: Normal alignment of all visualized joints.  Acute mildly comminuted   mildly displaced fracture involves the mid to distal diaphysis right   5th metacarpal bone with some adjacent soft tissue swelling.  The other   visualized bones appear normal.  No arthritic changes.  No radiopaque   foreign body.    Dominant Hand: Right  History of Present Condition/Illness: Pt sustained blunt trauma to her right hand after punching a tree out of anger on 5/17/25. Pt presented to the ED with an acute mildly comminuted mildly displaced fracture involves the mid to distal diaphysis right 5th metacarpal bone with some adjacent soft tissue swelling. She proceeded with conservative treatment of splinting until seen by a hand specialist. She proceeded with an open reduction internal fixation right fifth metacarpal shaft fracture 6/12/25. She was placed into a removable short arm velco brace, but she has only been wearing it while at work.      Activities of Daily Living  Social history was obtained from Patient.    General Prior Level of Function Comments: independent  General Current Level of Function Comments: independent  Patient Roles: Caregiver for pet  Patient Responsibilities: Community mobility, Driving, Financial management, Health management, Home management, Laundry, Personal ADL, Shopping, Meal prep    Previously independent with activities of daily living? Yes      Currently independent with activities of daily living? Yes     Patient is primarily using her left hand to perform most ADLs as she is limited in some ability to pull clothing into place using her right hand small and ring fingers.    Previously independent with instrumental activities of daily living? Yes     Currently independent with instrumental activities of daily living? Yes     Patient reported some shaking while handwriting while resting her ulna on the table. Patient is driving only left handed, but she did this prior to injury. She is compensating in all IADLs with her left hand.        Pain     Patient reports a current pain level of 0/10. Pain at best is reported as 0/10. Pain at worst is reported as 8/10.   Location: right 5th metacarpal into her elbow  Clinical Progression (since onset): Stable  Pain Qualities: Aching, Dull, Other (Comment)  Other Pain Qualities: numbness  Pain-Relieving Factors: Ice  Pain-Aggravating Factors: Holding objects, Other (Comment)  Other Pain-Aggravating Factors: grasping         Treatment History  Treatments  Previously Received Treatments: No  Currently Receiving Treatments: No    Living Arrangements  Living Situation  Housing: Home independently  Living Arrangements: Spouse/significant other  Support Systems: Family members, Friends/neighbors, Spouse/significant other        Employment  Patient reports: Does the patient's condition impact their ability to work?  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      Past Medical History/Physical Systems Review:   Josesito Moore  has a past medical history of ADHD (attention deficit hyperactivity disorder), Depression, Dermoid cyst of scalp, Dizzy, Enlarged heart, Heart murmur of , and Obesity.    Josesito Moore  has a past surgical history that includes Excision of cyst of scalp (2018) and excision,  superficial mass, head (Right, 9/18/2024).    Josesito has a current medication list which includes the following prescription(s): desogestrel-ethinyl estradiol and hydroxyzine hcl.    Review of patient's allergies indicates:  No Known Allergies     Objective   Wrist/Hand Observations  Right General Wrist/Hand Problems  Present: Adhesive Scar         Upper Extremity Sensation  General Upper Extremity Sensation  Impaired: Right  Right Upper Extremity Sensation  Diminished: Light Touch, Sharp/Dull Discrimination, and Proprioception            Impaired in the ulnar innervation of the right hand in the ring and small fingers, along 5th metacarpal        Elbow/Forearm Range of Motion   Right Elbow/Forearm   Active (deg) Passive (deg) Pain   Flexion         Extension         Forearm Pronation 90       Forearm Supination 80         Left Elbow/Forearm   Active (deg) Passive (deg) Pain   Flexion         Extension         Forearm Pronation 90       Forearm Supination 90                Wrist Range of Motion  Right Wrist   Active (deg) Passive (deg) Pain Comment   Flexion 90         Extension 70         Radial Deviation 20         Ulnar Deviation 30           Left Wrist   Active (deg) Passive (deg) Pain Comment   Flexion 90         Extension 70         Radial Deviation 20         Ulnar Deviation 30                     Adequate AROM: Right Hand and Left Hand                 Forearm Strength   Right Strength Right Pain Left Strength Left  Pain   Pronation 4+   5     Supination 4+   5         Wrist Strength - Planes of Motion   Right Strength Right Pain Left Strength Left  Pain   Flexion 4+   5     Extension 4+   5     Radial Deviation 4+   5     Ulnar Deviation (C8) 4+   5       Left  Strength  Left Hand Dynamometer Position: 2  Elbow Position Forearm Position Trial 1 (lbs) Trial 2 (lbs) Trial 3 (lbs) Average (lbs) Pain   Flexed Neutral 88 89 94 90.33         Right Pinch Strength   Trial 1 (lbs) Trial 2 (lbs) Trial 3 (lbs)  Average (lbs) Pain   Lateral (Key Pinch) 12 12 10 11.33     Three Point (Three Jaw Jerry) 9 11 10 10     Two Point (Tip to Tip) 8 8 7 7.67         Left Pinch Strength   Trial 1 (lbs) Trial 2 (lbs) Trial 3 (lbs) Average (lbs) Pain   Lateral (Key Pinch) 13 13 12 12.67     Three Point (Three Jaw Jerry) 15 13 15 14.33     Two Point (Tip to Tip) 8 8 9 8.33           /Pinch Details  Right  not tested secondary to postoperative status        Wrist/Hand Special Tests  Positive: Right Ulnar Tinel's  Hand Coordination Special Tests  Left In Hand Manipulation: Intact  Purdue Peg Test: R 14, L 13, B 18, assembly 6    Coordination  Coordination Tests  Intact: Left In Hand Manipulation                   Treatment:  Therapeutic Exercise  TE 1: flexor tendon glides, 15  TE 2: wrist flexion with forearm supported, 3x10; wrist extension with forearm supported, 3x10; forearm pronation to supination, 3x10; wrist radial to ulnar deviation, 3x10  Manual Therapy  MT 1: scar massage to right 5th metacarpal shaft incision to reduce hypersenstivity and scar tissue adhesions    Time Entry(in minutes):  OT Evaluation (Low) Time Entry: 41  Manual Therapy Time Entry: 5  Therapeutic Exercise Time Entry: 8    Assessment & Plan   Assessment  Josesito presents with a condition of Low complexity.   Presentation of Symptoms: Stable  Will Comorbidities Impact Care: No       ADL Limitations : Dressing  IADL Limitations: Care of pets, Grocery/shopping, Health management and maintenance, Home establishment and management, Meal preparation and cleanup, Safety and emergency maintenance  Work Limitations: Job performance  Leisure Limitations: Leisure participation  Functional Limitations: Activity tolerance, Carrying objects, Fine motor coordination, Manipulating objects, Pain with ADLs/IADLs, Proprioception                 Evaluation/Treatment Response: Patient responded to treatment well  Patient Goal for Therapy (OT): to gain strength needed to  return to work  Prognosis: Good  Assessment Details: Patient presented s/p open reduction internal fixation of the right fifth metacarpal shaft fracture. Patient has been diligently moving her right hand and wrist, which has reduced her joint stiffness and enabled wrist and hand range of motion within normal limits. Patient reported decreased wrist and  strength due to injury and limitation of use postoperatively. She also reported numbness in the ulnar innervation of the right hand and digits, indicating some nerve impingement with injury that will hopefully resolve as she heals. Patient would benefit from skilled OT services to progress functional grasp and pinch strength of the right hand needed to perform ADL/IADL without difficulty. Patient demonstrated good understanding of role and progression of rehab, and she demonstrated independence in home exercise program.    Plan  From an occupational therapy perspective, the patient would benefit from: Skilled Rehab Services    Planned therapy interventions include: Therapeutic exercise, Therapeutic activities, Manual therapy, Neuromuscular re-education, ADLs/IADLs, and Orthotic management and training.    Planned modalities to include: Paraffin bath, Ultrasound, Thermotherapy (hot pack), and Cryotherapy (cold pack).        Visit Frequency: 2 times Per Week for 6 Weeks.       This plan was discussed with Patient.   Discussion participants: Agreed Upon Plan of Care             The patient's spiritual, cultural, and educational needs were considered, and the patient is agreeable to the plan of care and goals.     Education  Education was done with Patient. The patient's learning style includes Demonstration, Listening, and Pictures/video. The patient Verbalizes understanding and Demonstrates understanding.                 Goals:   Active       long-term       patient will demonstrate right hand  and pinch strength WNL needed to perform ADL/IADL tasks  independently without difficulty.       Start:  07/09/25    Expected End:  08/27/25            patient will demonstrate        Start:  07/09/25            patient will demonstrate independence in progressive strengthening home exercise program upon discharge.       Start:  07/09/25    Expected End:  08/27/25               short-term       Patient to be independent with home exercise program as issued by Occupational Therapist, noted through return demonstration to increase clinical carry over.        Start:  07/09/25    Expected End:  08/06/25            patient will implement use of AE including dycem to enhance functional grasp strength of the right hand.       Start:  07/09/25    Expected End:  08/06/25            patient will report less guarding behaviors as evidenced by active use of the RUE in ADLs such as dressing and IADLs such as driving.       Start:  07/09/25    Expected End:  08/06/25                Christina Swan OT           Current Participants as of 7/9/2025    Name Type Comments Contact Info    Yara Coburn MD Referring Provider  [phone]    Signature pending            Sincerely,      Christina Swan OT  [facility]                                                            Dear Christina Swan OT,    RE: Ms. Josesito Moore, MRN: [id number]    I certify that I have reviewed the attached plan of care and agree to the details within.        ___________________________  ___________________________  Provider Printed Name   Provider Signed Name      ___________________________  Date and Time

## 2025-07-15 ENCOUNTER — CLINICAL SUPPORT (OUTPATIENT)
Dept: REHABILITATION | Facility: HOSPITAL | Age: 25
End: 2025-07-15
Payer: COMMERCIAL

## 2025-07-15 DIAGNOSIS — R20.1 IMPAIRED SENSATION: ICD-10-CM

## 2025-07-15 DIAGNOSIS — R29.898 DECREASED GRIP STRENGTH OF RIGHT HAND: Primary | ICD-10-CM

## 2025-07-15 PROCEDURE — 97530 THERAPEUTIC ACTIVITIES: CPT | Mod: PN

## 2025-07-15 NOTE — PROGRESS NOTES
Outpatient Rehab    Occupational Therapy Visit    Patient Name: Josesito Moore  MRN: [id number]  YOB: 2000  Encounter Date: 7/15/2025    Therapy Diagnosis:   Encounter Diagnoses   Name Primary?    Decreased  strength of right hand Yes    Impaired sensation      Physician: Yara Coburn MD    Physician Orders: Eval and Treat  Medical Diagnosis: Unspecified fracture of fifth metacarpal bone, right hand, initial encounter for closed fracture  Surgical Diagnosis: open reduction internal fixation right fifth metacarpal shaft fracture   Surgical Date: 6/12/2025  Days Since Last Surgery: 33    Visit # / Visits Authorized: 1 / 20  Insurance Authorization Period: 7/9/2025 to 12/31/2025  Date of Evaluation: 7/9/2025  Plan of Care Certification: 7/9/2025 to 10/9/2025      Time In: 0755   Time Out: 0833  Total Time (in minutes): 38   Total Billable Time (in minutes): 38    FOTO:  Intake Score: 73%  Survey Score 2: Not applicable for this Episode%  Survey Score 3: Not applicable for this Episode%    Precautions:  Strengthening Precautions: o heavy lifting, pulling, or pushing with the RUE. Light work duty only.    Subjective   Pt reported feeling pain-free in her right hand..  Pain reported as 0/10. right hand    Objective            Treatment:  Therapeutic Exercise  TE 1: flexor tendon glides, 20  TE 2: wrist flexion with forearm supported, 3x10; wrist extension with forearm supported, 3x10; forearm pronation to supination, 3x10; wrist radial to ulnar deviation, 3x10  Therapeutic Activity  TA 1: towel scrunch and pushes, 3 min  TA 2: towel spreads and pushes, 3 min  TA 3: chime ball in-hand manipulation, 3 min  TA 4: pencil twirling for enhanced coordination, 3 min  TA 5: O'Filiberto dexterity pin placement using tweezers to place 20 pins  TA 6: juxtacisor for 6 reps to enhance wrist mobility  TA 7: foam ball squeezes, 3x10  Manual Therapy  MT 1: scar massage to right 5th metacarpal shaft incision to reduce  hypersenstivity and scar tissue adhesions    Time Entry(in minutes):  Manual Therapy Time Entry: 5  Therapeutic Activity Time Entry: 25  Therapeutic Exercise Time Entry: 8    Assessment & Plan   Assessment: Patient engaged well in session with no report of pain. She demonstrated good functional right hand range of motion. Patient noted to fatigue quickly with continued use of the right hand in session. She feels challenged by some in-hand manipulation and coordination activities secondary to muscular weakness in the thenar eminence.  Evaluation/Treatment Tolerance: Patient tolerated treatment well    The patient will continue to benefit from skilled outpatient occupational therapy in order to address the deficits listed in the problem list on the initial evaluation, provide patient and family education, and maximize the patients level of independence in the home and community environments.     The patient's spiritual, cultural, and educational needs were considered, and the patient is agreeable to the plan of care and goals.     Education  Education was done with Patient. The patient's learning style includes Demonstration and Listening. The patient Verbalizes understanding and Demonstrates understanding.                 Plan: progress per protocol    Goals:   Active       long-term       patient will demonstrate right hand  and pinch strength WNL needed to perform ADL/IADL tasks independently without difficulty. (Progressing)       Start:  07/09/25    Expected End:  08/27/25            patient will demonstrate  (Progressing)       Start:  07/09/25            patient will demonstrate independence in progressive strengthening home exercise program upon discharge. (Progressing)       Start:  07/09/25    Expected End:  08/27/25               short-term       Patient to be independent with home exercise program as issued by Occupational Therapist, noted through return demonstration to increase clinical carry over.   (Met)       Start:  07/09/25    Expected End:  08/06/25    Resolved:  07/15/25         patient will implement use of AE including dycem to enhance functional grasp strength of the right hand. (Progressing)       Start:  07/09/25    Expected End:  08/06/25            patient will report less guarding behaviors as evidenced by active use of the RUE in ADLs such as dressing and IADLs such as driving. (Progressing)       Start:  07/09/25    Expected End:  08/06/25                Christina Swan, OT

## 2025-07-18 ENCOUNTER — HOSPITAL ENCOUNTER (EMERGENCY)
Facility: HOSPITAL | Age: 25
Discharge: HOME | End: 2025-07-18
Attending: EMERGENCY MEDICINE
Payer: COMMERCIAL

## 2025-07-18 VITALS
HEIGHT: 69 IN | DIASTOLIC BLOOD PRESSURE: 82 MMHG | TEMPERATURE: 98 F | HEART RATE: 70 BPM | BODY MASS INDEX: 38.51 KG/M2 | SYSTOLIC BLOOD PRESSURE: 138 MMHG | RESPIRATION RATE: 18 BRPM | OXYGEN SATURATION: 100 % | WEIGHT: 260 LBS

## 2025-07-18 DIAGNOSIS — U07.1 COVID-19: Primary | ICD-10-CM

## 2025-07-18 LAB
GROUP A STREP MOLECULAR (OHS): NEGATIVE
HCV AB SERPL QL IA: NORMAL
HIV 1+2 AB+HIV1 P24 AG SERPL QL IA: NORMAL
INFLUENZA A MOLECULAR (OHS): NEGATIVE
INFLUENZA B MOLECULAR (OHS): NEGATIVE
SARS-COV-2 RDRP RESP QL NAA+PROBE: POSITIVE

## 2025-07-18 PROCEDURE — 86803 HEPATITIS C AB TEST: CPT | Performed by: EMERGENCY MEDICINE

## 2025-07-18 PROCEDURE — 99284 EMERGENCY DEPT VISIT MOD MDM: CPT

## 2025-07-18 PROCEDURE — 87502 INFLUENZA DNA AMP PROBE: CPT | Performed by: NURSE PRACTITIONER

## 2025-07-18 PROCEDURE — 87651 STREP A DNA AMP PROBE: CPT | Performed by: NURSE PRACTITIONER

## 2025-07-18 PROCEDURE — U0002 COVID-19 LAB TEST NON-CDC: HCPCS | Performed by: NURSE PRACTITIONER

## 2025-07-18 PROCEDURE — 87389 HIV-1 AG W/HIV-1&-2 AB AG IA: CPT | Performed by: EMERGENCY MEDICINE

## 2025-07-18 RX ORDER — ONDANSETRON 4 MG/1
4 TABLET, FILM COATED ORAL EVERY 6 HOURS PRN
Qty: 30 TABLET | Refills: 0 | Status: SHIPPED | OUTPATIENT
Start: 2025-07-18

## 2025-07-18 RX ORDER — PROMETHAZINE HYDROCHLORIDE AND DEXTROMETHORPHAN HYDROBROMIDE 6.25; 15 MG/5ML; MG/5ML
5 SYRUP ORAL EVERY 4 HOURS PRN
Qty: 150 ML | Refills: 0 | Status: SHIPPED | OUTPATIENT
Start: 2025-07-18

## 2025-07-18 RX ORDER — BENZONATATE 200 MG/1
200 CAPSULE ORAL 3 TIMES DAILY PRN
Qty: 60 CAPSULE | Refills: 0 | Status: SHIPPED | OUTPATIENT
Start: 2025-07-18

## 2025-07-18 NOTE — ED PROVIDER NOTES
Encounter Date: 2025       History     Chief Complaint   Patient presents with    COVID-19 Concerns     Pt. C/o cough, congestion, body aches, and headache x 2-3 days. States she had a positive COVID test yesterday at home.     POV to ED with friend, patient as well with similar illness.  Patient complains of cough congestion with runny nose and sore throat for 2-3 days.  Fever and chills, body aches.  Denies chest pain or SOB.  No abdominal pain.  No concerns for pregnancy    The history is provided by the patient. No  was used.     Review of patient's allergies indicates:  No Known Allergies  Past Medical History:   Diagnosis Date    ADHD (attention deficit hyperactivity disorder)     Depression     Dermoid cyst of scalp     Dizzy     Enlarged heart     Heart murmur of      Obesity      Past Surgical History:   Procedure Laterality Date    Excision of cyst of scalp  2018    EXCISION, SUPERFICIAL MASS, HEAD Right 2024    Procedure: EXCISION, SUPERFICIAL MASS, HEAD;  Surgeon: Siva Gunter MD;  Location: [facility] OR;  Service: General;  Laterality: Right;     Family History   Problem Relation Name Age of Onset    Heart disease Mother      Heart disease Maternal Aunt      Heart disease Maternal Uncle      Heart disease Maternal Grandmother      Diabetes Maternal Grandmother      Heart disease Maternal Grandfather      Diabetes Paternal Grandfather       Social History[1]  Review of Systems   Constitutional:  Positive for chills, fatigue and fever.   HENT:  Positive for congestion and sore throat.    Respiratory:  Positive for cough. Negative for shortness of breath.    Cardiovascular:  Negative for chest pain.   Gastrointestinal:  Negative for abdominal pain, diarrhea, nausea and vomiting.   Musculoskeletal:  Positive for myalgias.   All other systems reviewed and are negative.      Physical Exam     Initial Vitals [25 1142]   BP Pulse Resp Temp SpO2   (!) 142/86 72 19  98 °F (36.7 °C) 100 %      MAP       --         Physical Exam    Nursing note and vitals reviewed.  Constitutional: She appears well-developed and well-nourished. No distress.   HENT:   Head: Normocephalic and atraumatic. Mouth/Throat: Oropharynx is clear and moist.   Redness without exudate or swelling   Eyes: Pupils are equal, round, and reactive to light.   Neck:   Normal range of motion.  Cardiovascular:  Normal rate and regular rhythm.           Pulmonary/Chest: Breath sounds normal. No respiratory distress.   Abdominal: Abdomen is soft.   Musculoskeletal:         General: Normal range of motion.      Cervical back: Normal range of motion.     Neurological: She is alert and oriented to person, place, and time. GCS score is 15. GCS eye subscore is 4. GCS verbal subscore is 5. GCS motor subscore is 6.   Skin: Skin is dry. Capillary refill takes less than 2 seconds.   Psychiatric: She has a normal mood and affect. Thought content normal.         ED Course   Procedures  Labs Reviewed   SARS-COV-2 RNA AMPLIFICATION, QUAL - Abnormal       Result Value    SARS COV-2 Molecular Positive (*)    INFLUENZA A & B BY MOLECULAR - Normal    INFLUENZA A MOLECULAR Negative      INFLUENZA B MOLECULAR  Negative     GROUP A STREP, MOLECULAR - Normal    Group A Strep Molecular Negative      Narrative:     Arcanobacterium haemolyticum and Beta Streptococcus group C and G will not be detected by this test method.  Please order Throat Culture ([id number]) if suspected.       HEPATITIS C ANTIBODY   HIV 1 / 2 ANTIBODY          Imaging Results    None          Medications - No data to display  Medical Decision Making  Presents for evaluation of flu-like illness, see HPI  Differentials include but not limited to viral illness, bacterial illness, otitis, sinusitis, bronchitis, pneumonia  @ 1319, awaiting lab results  Discharged home, diagnosis COVID-19, prescriptions for home use, work note given.  Instructed to Rest, increase fluids, lots of  water and liquids.  Tylenol and or Motrin as needed if not contraindicated.  Call clinic for office recheck.  Return as needed.  Negative strep and negative flu today.  Home quarantine.  Zinc and vitamin-C supplements over-the-counter as needed, agrees with care    Amount and/or Complexity of Data Reviewed  Labs: ordered. Decision-making details documented in ED Course.    Risk  OTC drugs.  Prescription drug management.               ED Course as of 07/18/25 1445   Fri Jul 18, 2025   1332 Influenza A & B by Molecular    Final resultCollected 07/18 12:22    Influenza A, Molecular Negative  Influenza B, Molecular Negative   COVID-19 Rapid Screening    Final resultCollected 07/18 12:22    SARS COV-2 MOLECULAR Positive   Group A Strep, Molecular    Final resultCollected 07/18 12:22    Group A Strep, Molecular Negative       [CP]      ED Course User Index  [CP] Kelsey Yung NP                               Clinical Impression:  Final diagnoses:  [U07.1] COVID-19 (Primary)         ED Disposition Condition    Discharge Stable          ED Prescriptions       Medication Sig Dispense Start Date End Date Auth. Provider    promethazine-dextromethorphan (PROMETHAZINE-DM) 6.25-15 mg/5 mL Syrp Take 5 mLs by mouth every 4 (four) hours as needed (coughing). 150 mL 7/18/2025 -- Kelsey Yung NP    benzonatate (TESSALON) 200 MG capsule Take 1 capsule (200 mg total) by mouth 3 (three) times daily as needed for Cough. 60 capsule 7/18/2025 -- Kelsey Yung NP    ondansetron (ZOFRAN) 4 MG tablet Take 1 tablet (4 mg total) by mouth every 6 (six) hours as needed for Nausea. 30 tablet 7/18/2025 -- Kelsey Yung NP          Follow-up Information       Follow up With Specialties Details Why Contact Info    Haritha Gee NP Family Medicine Call in 3 days  [address] [place] [address] [address]  [place] [address] [phone]                     Kelsey Yung NP  07/18/25 3459        Kelsey Yung NP  07/18/25 1400         [1]   Social History  Tobacco Use    Smoking status: Never    Smokeless tobacco: Never   Substance Use Topics    Alcohol use: No    Drug use: No        Kelsey Yung NP  07/18/25 1446

## 2025-07-18 NOTE — Clinical Note
"Josesito"Jay Moore was seen and treated in our emergency department on 7/18/2025.  She may return to work on 07/23/2025.       If you have any questions or concerns, please don't hesitate to call.      Kelsey Yung NP"

## 2025-07-18 NOTE — DISCHARGE INSTRUCTIONS
Rest, increase fluids, lots of water and liquids.  Tylenol and or Motrin as needed if not contraindicated.  Call clinic for office recheck.  Return as needed.  Negative strep and negative flu today.  Home quarantine.  Zinc and vitamin-C supplements over-the-counter as needed.

## 2025-07-24 ENCOUNTER — CLINICAL SUPPORT (OUTPATIENT)
Dept: REHABILITATION | Facility: HOSPITAL | Age: 25
End: 2025-07-24
Payer: COMMERCIAL

## 2025-07-24 DIAGNOSIS — R29.898 DECREASED GRIP STRENGTH OF RIGHT HAND: Primary | ICD-10-CM

## 2025-07-24 DIAGNOSIS — R20.1 IMPAIRED SENSATION: ICD-10-CM

## 2025-07-24 PROCEDURE — 97110 THERAPEUTIC EXERCISES: CPT | Mod: PN

## 2025-07-24 PROCEDURE — 97530 THERAPEUTIC ACTIVITIES: CPT | Mod: PN

## 2025-07-24 NOTE — PROGRESS NOTES
Outpatient Rehab    Occupational Therapy Visit    Patient Name: Josesito Moore  MRN: [id number]  YOB: 2000  Encounter Date: 7/24/2025    Therapy Diagnosis:   Encounter Diagnoses   Name Primary?    Decreased  strength of right hand Yes    Impaired sensation      Physician: Yara Coburn MD    Physician Orders: Eval and Treat  Medical Diagnosis: Unspecified fracture of fifth metacarpal bone, right hand, initial encounter for closed fracture  Surgical Diagnosis: open reduction internal fixation right fifth metacarpal shaft fracture   Surgical Date: 6/12/2025  Days Since Last Surgery: 42    Visit # / Visits Authorized: 2 / 20  Insurance Authorization Period: 7/9/2025 to 12/31/2025  Date of Evaluation: 7/9/2025  Plan of Care Certification: 7/9/2025 to 10/9/2025      Time In: 0753   Time Out: 0851  Total Time (in minutes): 58   Total Billable Time (in minutes): 58    FOTO:  Intake Score (%): 73  Survey Score 2 (%): Not applicable for this Episode  Survey Score 3 (%): Not applicable for this Episode    Precautions:  Strengthening Precautions: o heavy lifting, pulling, or pushing with the RUE. Light work duty only.    Subjective   Pt feels pain-free in her right hand. She has been discharged from her brace and cleared to return to work at normal duties..  Pain reported as 0/10. right hand    Objective            Treatment:  Therapeutic Exercise  TE 2: wrist flexion with forearm supported using 2#, 3x10; wrist extension with forearm supported using 2#, 3x10; forearm pronation to supination using 2# distal weight, 3x10; wrist radial to ulnar deviation using 2# distal, 3x10  Therapeutic Activity  TA 1: green flex bar smiles, frowns, and kali twists, 3x10 each  TA 2: green theraputty gross grasp squeezes, tip pinch, tripod pinch, lateral pinch, and finger spreads; 3x10 each  TA 3: chime ball in-hand manipulation, 3 min  TA 4: L1 gross grasp pulses, 3x10  TA 5: O'Filiberto dexterity pin placement using tweezers  to place 30 pins  TA 6: L2 sustained grasp for 2 min  TA 7: black web hook grasp, 3x10  Manual Therapy  MT 1: scar massage to right 5th metacarpal shaft incision to reduce hypersenstivity and scar tissue adhesions    Time Entry(in minutes):  Manual Therapy Time Entry: 5  Therapeutic Activity Time Entry: 45  Therapeutic Exercise Time Entry: 8    Assessment & Plan   Assessment: Patient engaged well in session with no report of pain. Patient performed all therapeutic exercises well with good form using weighted resistance. Challenged functional grasp and pinch in therapeutic activities with no report of pain. Continued scar massage for desensitization with positive report following. She is returning to work at full duty next week with no concerns with use of her right hand.  Evaluation/Treatment Tolerance: Patient tolerated treatment well    The patient will continue to benefit from skilled outpatient occupational therapy in order to address the deficits listed in the problem list on the initial evaluation, provide patient and family education, and maximize the patients level of independence in the home and community environments.     The patient's spiritual, cultural, and educational needs were considered, and the patient is agreeable to the plan of care and goals.     Education  Education was done with Patient. The patient's learning style includes Demonstration and Listening. The patient Verbalizes understanding and Demonstrates understanding.                 Plan:      Goals:   Active       long-term       patient will demonstrate right hand  and pinch strength WNL needed to perform ADL/IADL tasks independently without difficulty. (Progressing)       Start:  07/09/25    Expected End:  08/27/25            patient will demonstrate  (Progressing)       Start:  07/09/25            patient will demonstrate independence in progressive strengthening home exercise program upon discharge. (Progressing)       Start:   07/09/25    Expected End:  08/27/25              Resolved       short-term       Patient to be independent with home exercise program as issued by Occupational Therapist, noted through return demonstration to increase clinical carry over.  (Met)       Start:  07/09/25    Expected End:  08/06/25    Resolved:  07/15/25         patient will implement use of AE including dycem to enhance functional grasp strength of the right hand. (Met)       Start:  07/09/25    Expected End:  08/06/25    Resolved:  07/24/25         patient will report less guarding behaviors as evidenced by active use of the RUE in ADLs such as dressing and IADLs such as driving. (Met)       Start:  07/09/25    Expected End:  08/06/25    Resolved:  07/24/25             Christina Swan, OT

## 2025-07-25 ENCOUNTER — CLINICAL SUPPORT (OUTPATIENT)
Dept: REHABILITATION | Facility: HOSPITAL | Age: 25
End: 2025-07-25
Payer: COMMERCIAL

## 2025-07-25 DIAGNOSIS — R20.1 IMPAIRED SENSATION: ICD-10-CM

## 2025-07-25 DIAGNOSIS — R29.898 DECREASED GRIP STRENGTH OF RIGHT HAND: Primary | ICD-10-CM

## 2025-07-25 PROCEDURE — 97530 THERAPEUTIC ACTIVITIES: CPT | Mod: PN

## 2025-07-25 PROCEDURE — 97140 MANUAL THERAPY 1/> REGIONS: CPT | Mod: PN

## 2025-07-25 PROCEDURE — 97110 THERAPEUTIC EXERCISES: CPT | Mod: PN

## 2025-07-25 NOTE — PROGRESS NOTES
Outpatient Rehab    Occupational Therapy Visit    Patient Name: Josesito Moore  MRN: [id number]  YOB: 2000  Encounter Date: 7/25/2025    Therapy Diagnosis:   Encounter Diagnoses   Name Primary?    Decreased  strength of right hand Yes    Impaired sensation      Physician: Yara Coburn MD    Physician Orders: Eval and Treat  Medical Diagnosis: Unspecified fracture of fifth metacarpal bone, right hand, initial encounter for closed fracture  Surgical Diagnosis: open reduction internal fixation right fifth metacarpal shaft fracture   Surgical Date: 6/12/2025  Days Since Last Surgery: 43    Visit # / Visits Authorized: 3 / 12  Insurance Authorization Period: 7/24/2025 to 10/22/2025  Date of Evaluation: 7/9/2025  Plan of Care Certification: 7/9/2025 to 10/9/2025      Time In: 0757   Time Out: 0850  Total Time (in minutes): 53   Total Billable Time (in minutes): 53    FOTO:  Intake Score (%): 73  Survey Score 2 (%): Not applicable for this Episode  Survey Score 3 (%): Not applicable for this Episode    Precautions:  Strengthening Precautions: o heavy lifting, pulling, or pushing with the RUE. Light work duty only.    Subjective   Pt reported feeling some soreness following yesterday's session, but she has no pain..  Pain reported as 0/10. right hand    Objective            Treatment:  Therapeutic Exercise  TE 2: wrist flexion with forearm supported using 2#, 3x10; wrist extension with forearm supported using 2#, 3x10; forearm pronation to supination using 2# distal weight, 3x10; wrist radial to ulnar deviation using 2# distal, 3x10  TE 3: 7# digi-flex, 2x15  Therapeutic Activity  TA 1: green flex bar smiles, frowns, and kali twists, 3x10 each  TA 2: green theraputty gross grasp squeezes, tip pinch, tripod pinch, lateral pinch, and finger spreads; 3x10 each  TA 3: chime ball in-hand manipulation, 3 min  TA 4: L1 gross grasp pulses, 3x10  TA 5: O'Filiberto dexterity pin placement using tweezers to place 30  pins  TA 6: L2 sustained grasp for 2 min  TA 7: black web hook grasp, 3x10  TA 8: black web WB for 3 second hold, 3x10  TA 9: ulnar deviation WB for 3 second hold, 3x10 using green flex bar  Manual Therapy  MT 1: scar massage to right 5th metacarpal shaft incision to reduce hypersenstivity and scar tissue adhesions    Time Entry(in minutes):  Manual Therapy Time Entry: 8  Therapeutic Activity Time Entry: 37  Therapeutic Exercise Time Entry: 8    Assessment & Plan   Assessment: Patient engaged well in session with no report of pain, though she did experience some soreness following increase in resistance in yesterday's session. Patient performed all therapeutic exercises well with good form using weighted resistance. Continued to to challenge functional grasp, pinch, and muscular endurance. Added weightbearing activity to tolerance. Positive report of decreased discomfort with scar massage.  Evaluation/Treatment Tolerance: Patient tolerated treatment well    The patient will continue to benefit from skilled outpatient occupational therapy in order to address the deficits listed in the problem list on the initial evaluation, provide patient and family education, and maximize the patients level of independence in the home and community environments.     The patient's spiritual, cultural, and educational needs were considered, and the patient is agreeable to the plan of care and goals.     Education  Education was done with Patient. The patient's learning style includes Demonstration and Listening. The patient Verbalizes understanding and Demonstrates understanding.                 Plan: progress per protocol    Goals:   Active       long-term       patient will demonstrate right hand  and pinch strength WNL needed to perform ADL/IADL tasks independently without difficulty. (Progressing)       Start:  07/09/25    Expected End:  08/27/25            patient will demonstrate independence in progressive strengthening  home exercise program upon discharge. (Progressing)       Start:  07/09/25    Expected End:  08/27/25              Resolved       short-term       Patient to be independent with home exercise program as issued by Occupational Therapist, noted through return demonstration to increase clinical carry over.  (Met)       Start:  07/09/25    Expected End:  08/06/25    Resolved:  07/15/25         patient will implement use of AE including dycem to enhance functional grasp strength of the right hand. (Met)       Start:  07/09/25    Expected End:  08/06/25    Resolved:  07/24/25         patient will report less guarding behaviors as evidenced by active use of the RUE in ADLs such as dressing and IADLs such as driving. (Met)       Start:  07/09/25    Expected End:  08/06/25    Resolved:  07/24/25             Christina Swan, OT

## 2025-07-26 ENCOUNTER — HOSPITAL ENCOUNTER (EMERGENCY)
Facility: HOSPITAL | Age: 25
Discharge: HOME | End: 2025-07-26
Attending: EMERGENCY MEDICINE
Payer: COMMERCIAL

## 2025-07-26 VITALS
OXYGEN SATURATION: 99 % | TEMPERATURE: 98 F | HEART RATE: 85 BPM | BODY MASS INDEX: 38.51 KG/M2 | DIASTOLIC BLOOD PRESSURE: 92 MMHG | RESPIRATION RATE: 18 BRPM | HEIGHT: 69 IN | SYSTOLIC BLOOD PRESSURE: 133 MMHG | WEIGHT: 260 LBS

## 2025-07-26 DIAGNOSIS — R05.9 COUGH: ICD-10-CM

## 2025-07-26 PROCEDURE — 94760 N-INVAS EAR/PLS OXIMETRY 1: CPT

## 2025-07-26 PROCEDURE — 71046 X-RAY EXAM CHEST 2 VIEWS: CPT | Mod: 26,,, | Performed by: STUDENT IN AN ORGANIZED HEALTH CARE EDUCATION/TRAINING PROGRAM

## 2025-07-26 PROCEDURE — 71046 X-RAY EXAM CHEST 2 VIEWS: CPT | Mod: TC

## 2025-07-26 PROCEDURE — 99283 EMERGENCY DEPT VISIT LOW MDM: CPT | Mod: 25

## 2025-07-26 RX ORDER — GUAIFENESIN AND DEXTROMETHORPHAN HYDROBROMIDE 10; 100 MG/5ML; MG/5ML
5 SYRUP ORAL 4 TIMES DAILY PRN
Qty: 120 ML | Refills: 0 | Status: SHIPPED | OUTPATIENT
Start: 2025-07-26 | End: 2025-08-05

## 2025-07-26 RX ORDER — METHYLPREDNISOLONE 4 MG/1
TABLET ORAL
Qty: 1 EACH | Refills: 0 | Status: SHIPPED | OUTPATIENT
Start: 2025-07-26

## 2025-07-26 NOTE — DISCHARGE INSTRUCTIONS
Chest x-ray is clear, no evidence of pneumonia.  Take Medrol Dosepak and use Robitussin DM as prescribed.  Do not drive or drink alcohol after taking this medication.  Follow-up with your doctor, and return here as needed.

## 2025-07-26 NOTE — ED PROVIDER NOTES
Encounter Date: 2025       History     Chief Complaint   Patient presents with    General Illness     25-year-old female, diagnosed with the COVID 8 days ago, herecomplaining of persistent cough and shortness of breath.  Also complains of occasional low-grade fevers.  Has been using a prescribed cough medicine without relief.  She is here with a partner, who also has similar symptoms.  O2 saturations 100% room air.      Review of patient's allergies indicates:  No Known Allergies  Past Medical History:   Diagnosis Date    ADHD (attention deficit hyperactivity disorder)     Depression     Dermoid cyst of scalp     Dizzy     Enlarged heart     Heart murmur of      Obesity      Past Surgical History:   Procedure Laterality Date    Excision of cyst of scalp  2018    EXCISION, SUPERFICIAL MASS, HEAD Right 2024    Procedure: EXCISION, SUPERFICIAL MASS, HEAD;  Surgeon: Siva Gunter MD;  Location: [facility];  Service: General;  Laterality: Right;     Family History   Problem Relation Name Age of Onset    Heart disease Mother      Heart disease Maternal Aunt      Heart disease Maternal Uncle      Heart disease Maternal Grandmother      Diabetes Maternal Grandmother      Heart disease Maternal Grandfather      Diabetes Paternal Grandfather       Social History[1]  Review of Systems   Constitutional:  Positive for chills and fever (Subjective).   HENT:  Negative for congestion and rhinorrhea.    Respiratory:  Positive for cough. Negative for wheezing.    Cardiovascular:  Negative for chest pain and palpitations.   Gastrointestinal:  Negative for abdominal pain, constipation and diarrhea.   Genitourinary:  Negative for decreased urine volume, dysuria, flank pain and frequency.   Musculoskeletal:  Negative for arthralgias and myalgias.   Neurological:  Negative for dizziness, syncope, weakness, light-headedness, numbness and headaches.   Psychiatric/Behavioral:  Negative for confusion. The patient is not  nervous/anxious.        Physical Exam     Initial Vitals   BP Pulse Resp Temp SpO2   07/26/25 0824 07/26/25 0822 07/26/25 0822 07/26/25 0822 07/26/25 0822   (!) 157/78 85 20 98.2 °F (36.8 °C) 100 %      MAP       --                Physical Exam    Nursing note and vitals reviewed.  Constitutional: She appears well-developed and well-nourished. She is not diaphoretic. No distress.   HENT:   Head: Normocephalic and atraumatic.   Nose: Nose normal. Mouth/Throat: Oropharynx is clear and moist. No oropharyngeal exudate.   Eyes: Conjunctivae and EOM are normal. Pupils are equal, round, and reactive to light. No scleral icterus.   Neck: Neck supple. No JVD present.   Normal range of motion.  Cardiovascular:  Normal rate, regular rhythm, normal heart sounds and intact distal pulses.           No murmur heard.  Pulmonary/Chest: Breath sounds normal. No stridor. No respiratory distress. She has no wheezes. She has no rhonchi. She has no rales.   Nonproductive cough, coughing during exam   Abdominal: Abdomen is soft. Bowel sounds are normal. She exhibits no distension. There is no abdominal tenderness.   Musculoskeletal:         General: No tenderness or edema. Normal range of motion.      Cervical back: Normal range of motion and neck supple.     Neurological: She is alert and oriented to person, place, and time. She has normal strength. No cranial nerve deficit or sensory deficit. GCS score is 15. GCS eye subscore is 4. GCS verbal subscore is 5. GCS motor subscore is 6.   Skin: Skin is warm and dry. Capillary refill takes less than 2 seconds. No rash noted. No erythema.   Psychiatric: She has a normal mood and affect. Her behavior is normal.         ED Course   Procedures  Labs Reviewed - No data to display       Imaging Results              X-Ray Chest PA And Lateral (Final result)  Result time 07/26/25 09:14:37      Final result by Jerry Lundberg MD (07/26/25 09:14:37)                   Impression:      No acute  cardiopulmonary process.      Electronically signed by: Jerry Lundberg  Date:    07/26/2025  Time:    09:14               Narrative:    EXAMINATION:  XR CHEST PA AND LATERAL    CLINICAL HISTORY:  Cough, unspecified    TECHNIQUE:  PA and lateral views of the chest were performed.    COMPARISON:  02/20/2023    FINDINGS:  Normal cardiomediastinal silhouette.  Normal appearance of the pulmonary vasculature.  No focal airspace opacity, pleural effusion, or pneumothorax.  Osseous structures appear intact.                                       Medications - No data to display  Medical Decision Making  25-year-old female, persistent cough after COVID-19 diagnosis 8 days ago.  Differential includes pneumonia, pneumothorax, bronchitis, etc.      Still coughing after COVID infection, x-ray clear, no pneumonia, no obvious bronchitis changes etc..  Will prescribe a Medrol Dosepak.  Patient will continue with prescribed an over-the-counter cough medications, follow-up with PCP.  Return here as needed.      Amount and/or Complexity of Data Reviewed  Radiology: ordered.    Risk  OTC drugs.  Prescription drug management.                                          Clinical Impression:  Final diagnoses:  [R05.9] Cough          ED Disposition Condition    Discharge Stable          ED Prescriptions       Medication Sig Dispense Start Date End Date Auth. Provider    methylPREDNISolone (MEDROL DOSEPACK) 4 mg tablet Take as directed 1 each 7/26/2025 -- Kvng Temple MD    dextromethorphan-guaiFENesin  mg/5 ml (ROBITUSSIN-DM)  mg/5 mL liquid Take 5 mLs by mouth 4 (four) times daily as needed (cough). 120 mL 7/26/2025 8/5/2025 Kvng Temple MD          Follow-up Information       Follow up With Specialties Details Why Contact Info    Your primary care provider  Call today                     [1]   Social History  Tobacco Use    Smoking status: Never    Smokeless tobacco: Never   Substance Use Topics    Alcohol use: No    Drug  use: No        Kvng Temple MD  07/26/25 0955

## 2025-07-26 NOTE — ED TRIAGE NOTES
Pt presents to the ed with c/o cough and congestion. Pt states that she tested + for covid last week. Symptoms are not improving.

## 2025-07-31 ENCOUNTER — CLINICAL SUPPORT (OUTPATIENT)
Dept: REHABILITATION | Facility: HOSPITAL | Age: 25
End: 2025-07-31
Payer: COMMERCIAL

## 2025-07-31 DIAGNOSIS — R20.1 IMPAIRED SENSATION: ICD-10-CM

## 2025-07-31 DIAGNOSIS — R29.898 DECREASED GRIP STRENGTH OF RIGHT HAND: Primary | ICD-10-CM

## 2025-07-31 PROCEDURE — 97110 THERAPEUTIC EXERCISES: CPT | Mod: PN

## 2025-07-31 PROCEDURE — 97530 THERAPEUTIC ACTIVITIES: CPT | Mod: PN

## 2025-07-31 PROCEDURE — 97140 MANUAL THERAPY 1/> REGIONS: CPT | Mod: PN

## 2025-07-31 NOTE — PROGRESS NOTES
Outpatient Rehab    Occupational Therapy Visit    Patient Name: Josesito Moore  MRN: [id number]  YOB: 2000  Encounter Date: 7/31/2025    Therapy Diagnosis:   Encounter Diagnoses   Name Primary?    Decreased  strength of right hand Yes    Impaired sensation      Physician: Yara Coburn MD    Physician Orders: Eval and Treat  Medical Diagnosis: Unspecified fracture of fifth metacarpal bone, right hand, initial encounter for closed fracture  Surgical Diagnosis: open reduction internal fixation right fifth metacarpal shaft fracture   Surgical Date: 6/12/2025  Days Since Last Surgery: 49    Visit # / Visits Authorized: 4 / 12  Insurance Authorization Period: 7/24/2025 to 10/22/2025  Date of Evaluation: 7/9/2025  Plan of Care Certification: 7/9/2025 to 10/9/2025      Time In: 1432   Time Out: 1525  Total Time (in minutes): 53   Total Billable Time (in minutes): 53    FOTO:  Intake Score (%): 73  Survey Score 2 (%): Not applicable for this Episode  Survey Score 3 (%): Not applicable for this Episode    Precautions:  Strengthening Precautions: o heavy lifting, pulling, or pushing with the RUE. Light work duty only.    Subjective   Pt is concerned that she can feel the hardward with palpation, but she has no pain..  Pain reported as 0/10. right hand    Objective            Treatment:  Therapeutic Exercise  TE 2: wrist flexion with forearm supported using 3#, 3x10; wrist extension with forearm supported using 3#, 3x10; forearm pronation to supination using 2# distal weight, 3x10; wrist radial to ulnar deviation using 3# distal, 3x10  TE 3: 7# digi-flex, 2x15  Therapeutic Activity  TA 1: green flex bar smiles, green flex bar frowns, and blue flex bar kali twists, 3x10 each  TA 2: green theraputty gross grasp squeezes, tip pinch, tripod pinch, lateral pinch, and finger spreads; 3x10 each  TA 3: chime ball in-hand manipulation, 3 min  TA 4: L2 gross grasp pulses, 3x10  TA 5: O'Filiberto dexterity pin placement  using tweezers to place 30 pins  TA 6: L2 sustained grasp for 2 min  TA 7: blue digi-extend, 3x10  TA 8: black web WB for 3 second hold, 3x10  TA 9: ulnar deviation WB for 3 second hold, 3x10 using green flex bar  Manual Therapy  MT 1: scar massage to right 5th metacarpal shaft incision to reduce hypersenstivity and scar tissue adhesions    Time Entry(in minutes):  Manual Therapy Time Entry: 8  Therapeutic Activity Time Entry: 37  Therapeutic Exercise Time Entry: 8    Assessment & Plan   Assessment: Patient engaged well in session with no report of pain. Patient reported some muscular fatigue with continued grasping activities at work, however, she had no pain in her work tasks. Patient performed all therapeutic exercises well with good form. Challenged functional grasp and weightbearing through the RUE with no pain. Continued scar massage to reduce hypersensitivity with positive report following.  Evaluation/Treatment Tolerance: Patient tolerated treatment well    The patient will continue to benefit from skilled outpatient occupational therapy in order to address the deficits listed in the problem list on the initial evaluation, provide patient and family education, and maximize the patients level of independence in the home and community environments.     The patient's spiritual, cultural, and educational needs were considered, and the patient is agreeable to the plan of care and goals.     Education  Education was done with Patient. The patient's learning style includes Demonstration and Listening. The patient Verbalizes understanding and Demonstrates understanding.                 Plan: progress per protocol    Goals:   Active       long-term       patient will demonstrate right hand  and pinch strength WNL needed to perform ADL/IADL tasks independently without difficulty. (Progressing)       Start:  07/09/25    Expected End:  08/27/25            patient will demonstrate independence in progressive  strengthening home exercise program upon discharge. (Progressing)       Start:  07/09/25    Expected End:  08/27/25              Resolved       short-term       Patient to be independent with home exercise program as issued by Occupational Therapist, noted through return demonstration to increase clinical carry over.  (Met)       Start:  07/09/25    Expected End:  08/06/25    Resolved:  07/15/25         patient will implement use of AE including dycem to enhance functional grasp strength of the right hand. (Met)       Start:  07/09/25    Expected End:  08/06/25    Resolved:  07/24/25         patient will report less guarding behaviors as evidenced by active use of the RUE in ADLs such as dressing and IADLs such as driving. (Met)       Start:  07/09/25    Expected End:  08/06/25    Resolved:  07/24/25             Christina Swan, OT

## 2025-08-05 ENCOUNTER — CLINICAL SUPPORT (OUTPATIENT)
Dept: REHABILITATION | Facility: HOSPITAL | Age: 25
End: 2025-08-05
Payer: COMMERCIAL

## 2025-08-05 DIAGNOSIS — R29.898 DECREASED GRIP STRENGTH OF RIGHT HAND: Primary | ICD-10-CM

## 2025-08-05 DIAGNOSIS — R20.1 IMPAIRED SENSATION: ICD-10-CM

## 2025-08-05 PROCEDURE — 97018 PARAFFIN BATH THERAPY: CPT | Mod: PN

## 2025-08-05 PROCEDURE — 97530 THERAPEUTIC ACTIVITIES: CPT | Mod: PN

## 2025-08-05 PROCEDURE — 97110 THERAPEUTIC EXERCISES: CPT | Mod: PN

## 2025-08-05 NOTE — PROGRESS NOTES
Outpatient Rehab    Occupational Therapy Visit    Patient Name: Josesito Moore  MRN: [id number]  YOB: 2000  Encounter Date: 8/5/2025    Therapy Diagnosis:   Encounter Diagnoses   Name Primary?    Decreased  strength of right hand Yes    Impaired sensation      Physician: aYra Coburn MD    Physician Orders: Eval and Treat  Medical Diagnosis: Unspecified fracture of fifth metacarpal bone, right hand, initial encounter for closed fracture  Surgical Diagnosis: open reduction internal fixation right fifth metacarpal shaft fracture   Surgical Date: 6/12/2025  Days Since Last Surgery: 54    Visit # / Visits Authorized: 5 / 12  Insurance Authorization Period: 7/24/2025 to 10/22/2025  Date of Evaluation: 7/9/2025  Plan of Care Certification: 7/9/2025 to 10/9/2025      Time In: 1521   Time Out: 1619  Total Time (in minutes): 58   Total Billable Time (in minutes): 58    FOTO:  Intake Score (%): 73  Survey Score 2 (%): Not applicable for this Episode  Survey Score 3 (%): Not applicable for this Episode    Precautions:  Strengthening Precautions: o heavy lifting, pulling, or pushing with the RUE. Light work duty only.    Subjective   She feels some tingling in her hand after hitting her hand today..  Pain reported as 0/10. right hand    Objective                    Treatment:  Therapeutic Exercise  TE 2: wrist flexion with forearm supported using 3#, 3x10; wrist extension with forearm supported using 3#, 3x10; forearm pronation to supination using 2# distal weight, 3x10; wrist radial to ulnar deviation using 3# distal, 3x10  TE 3: 7# digi-flex, 2x15  Therapeutic Activity  TA 1: green flex bar smiles, green flex bar frowns, and blue flex bar kali twists, 3x10 each  TA 2: blue theraputty push downs and pulls, 3x10 each  TA 3: chime ball in-hand manipulation, 3 min  TA 4: L3 gross grasp pulses, 3x10  TA 5: O'Filiberto dexterity pin placement using tweezers to place 30 pins  TA 6: L3 sustained grasp for 2 min  TA  "7: blue digi-extend, 3x10  TA 8: black web WB for 3 second hold, 3x10  TA 9: ulnar deviation WB for 3 second hold, 3x10 using blue flex bar  TA 10: plyometric ball toss using green ball, 3x10 each side  Manual Therapy  MT 1: scar massage to right 5th metacarpal shaft incision to reduce hypersenstivity and scar tissue adhesions  Modalities  Paraffin Bath: paraffin wax to the right hand for 8 min to reduce discomfort following engagement in session    Time Entry(in minutes):  Paraffin Bath Time Entry: 8  Manual Therapy Time Entry: 5  Therapeutic Activity Time Entry: 37  Therapeutic Exercise Time Entry: 8    Assessment & Plan   Assessment: Patient engaged well in session. Patient performed all therapeutic exercises well using weighted resistance. Challenged functional grasp strength at L3 sustained hold and pulse grasping. Introduced plyometrics ball toss to increase power and neuromuscular coordination; she tolerated well. She reported some "arthritis pain" with application of cold cream in scar massage. Initiated paraffin wax with positive report of relief of discomfort. Patient is appropriate for discharge with home exercise program next session.   Evaluation/Treatment Tolerance: Patient tolerated treatment well    The patient will continue to benefit from skilled outpatient occupational therapy in order to address the deficits listed in the problem list on the initial evaluation, provide patient and family education, and maximize the patients level of independence in the home and community environments.     The patient's spiritual, cultural, and educational needs were considered, and the patient is agreeable to the plan of care and goals.     Education  Education was done with Patient. The patient's learning style includes Demonstration and Listening. The patient Verbalizes understanding and Demonstrates understanding.                 Plan: discharge with [facility] next session    Goals:   Active       long-term       " patient will demonstrate right hand  and pinch strength WNL needed to perform ADL/IADL tasks independently without difficulty. (Progressing)       Start:  07/09/25    Expected End:  08/27/25            patient will demonstrate independence in progressive strengthening home exercise program upon discharge. (Progressing)       Start:  07/09/25    Expected End:  08/27/25              Resolved       short-term       Patient to be independent with home exercise program as issued by Occupational Therapist, noted through return demonstration to increase clinical carry over.  (Met)       Start:  07/09/25    Expected End:  08/06/25    Resolved:  07/15/25         patient will implement use of AE including dycem to enhance functional grasp strength of the right hand. (Met)       Start:  07/09/25    Expected End:  08/06/25    Resolved:  07/24/25         patient will report less guarding behaviors as evidenced by active use of the RUE in ADLs such as dressing and IADLs such as driving. (Met)       Start:  07/09/25    Expected End:  08/06/25    Resolved:  07/24/25             Christina Swan, OT

## 2025-08-07 ENCOUNTER — CLINICAL SUPPORT (OUTPATIENT)
Dept: REHABILITATION | Facility: HOSPITAL | Age: 25
End: 2025-08-07
Payer: COMMERCIAL

## 2025-08-07 DIAGNOSIS — R20.1 IMPAIRED SENSATION: ICD-10-CM

## 2025-08-07 DIAGNOSIS — R29.898 DECREASED GRIP STRENGTH OF RIGHT HAND: Primary | ICD-10-CM

## 2025-08-07 PROCEDURE — 97110 THERAPEUTIC EXERCISES: CPT | Mod: PN

## 2025-08-07 PROCEDURE — 97530 THERAPEUTIC ACTIVITIES: CPT | Mod: PN

## 2025-08-07 NOTE — PROGRESS NOTES
Outpatient Rehab    Occupational Therapy Discharge    Patient Name: Josesito Moore  MRN: [id number]  YOB: 2000  Encounter Date: 8/7/2025    Therapy Diagnosis:   Encounter Diagnoses   Name Primary?    Decreased  strength of right hand Yes    Impaired sensation      Physician: Yara Coburn MD    Physician Orders: Eval and Treat  Medical Diagnosis: Unspecified fracture of fifth metacarpal bone, right hand, initial encounter for closed fracture  Surgical Diagnosis: open reduction internal fixation right fifth metacarpal shaft fracture   Surgical Date: 6/12/2025  Days Since Last Surgery: 56    Visit # / Visits Authorized: 6 / 12  Insurance Authorization Period: 7/24/2025 to 10/22/2025  Date of Evaluation: 7/9/2025  Plan of Care Certification: 7/9/2025 to 10/9/2025      Time In: 1526   Time Out: 1619  Total Time (in minutes): 53   Total Billable Time (in minutes): 53 (2:1 doubled)    FOTO:  Intake Score (%): 73  Survey Score 2 (%): 98  Survey Score 3 (%): Not applicable for this Episode    Precautions:  Strengthening Precautions: o heavy lifting, pulling, or pushing with the RUE. Light work duty only.    Subjective   Pt is pain-free in her right hand..  Pain reported as 0/10. right hand    Objective      Wrist Range of Motion  Right Wrist   Active (deg) Passive (deg) Pain Comment   Flexion 90         Extension 70         Radial Deviation 20         Ulnar Deviation 30                     Adequate AROM: Right Hand and Left Hand                 Forearm Strength   Right Strength Right Pain Left Strength Left  Pain   Pronation 5         Supination 5             Wrist Strength - Planes of Motion   Right Strength Right Pain Left Strength Left  Pain   Flexion 5         Extension 5         Radial Deviation 5         Ulnar Deviation (C8) 5           Hand Strength - Planes of Motion   Right Strength Right Pain Left Strength Left  Pain   Thumb MCP Flexion 5         Thumb Palmar ABduction 5         Thumb ADduction 5          Thumb Opposition 5         MCP Flexion/Lumbricals 5         Digits 2-4 ABduction (T1) 5         Digit 5 ABduction 5         Digits 2-5 ADduction 5         Digits 2-5 PIP/DIP Flexion 5           Right  Strength  Right Hand Dynamometer Position: 2  Elbow Position Forearm Position Trial 1 (lbs) Trial 2  (lbs) Trial 3  (lbs) Average  (lbs) Pain   Flexed Neutral 89 94 104 95.67         Right Pinch Strength   Trial 1 (lbs) Trial 2 (lbs) Trial 3 (lbs) Average (lbs) Pain   Lateral (Key Pinch) 17 15 15 15.67     Three Point (Three Jaw Jerry) 16 15 17 16     Two Point (Tip to Tip) 13 11 13 12.33                 COMPARED TO EVALUATION  Elbow/Forearm Range of Motion   Right Elbow/Forearm    Active (deg) Passive (deg) Pain   Flexion         Extension         Forearm Pronation 90       Forearm Supination 80          Left Elbow/Forearm    Active (deg) Passive (deg) Pain   Flexion         Extension         Forearm Pronation 90       Forearm Supination 90                   Wrist Range of Motion  Right Wrist    Active (deg) Passive (deg) Pain Comment   Flexion 90         Extension 70         Radial Deviation 20         Ulnar Deviation 30            Left Wrist    Active (deg) Passive (deg) Pain Comment   Flexion 90         Extension 70         Radial Deviation 20         Ulnar Deviation 30                         Adequate AROM: Right Hand and Left Hand                     Forearm Strength    Right Strength Right Pain Left Strength Left  Pain   Pronation 4+   5     Supination 4+   5           Wrist Strength - Planes of Motion    Right Strength Right Pain Left Strength Left  Pain   Flexion 4+   5     Extension 4+   5     Radial Deviation 4+   5     Ulnar Deviation (C8) 4+   5        Left  Strength  Left Hand Dynamometer Position: 2  Elbow Position Forearm Position Trial 1 (lbs) Trial 2 (lbs) Trial 3 (lbs) Average (lbs) Pain   Flexed Neutral 88 89 94 90.33           Right Pinch Strength    Trial 1 (lbs) Trial 2 (lbs)  Trial 3 (lbs) Average (lbs) Pain   Lateral (Key Pinch) 12 12 10 11.33     Three Point (Three Jaw Jerry) 9 11 10 10     Two Point (Tip to Tip) 8 8 7 7.67           Left Pinch Strength    Trial 1 (lbs) Trial 2 (lbs) Trial 3 (lbs) Average (lbs) Pain   Lateral (Key Pinch) 13 13 12 12.67     Three Point (Three Jaw Jerry) 15 13 15 14.33     Two Point (Tip to Tip) 8 8 9 8.33              /Pinch Details  Right  not tested secondary to postoperative status     Treatment:  Therapeutic Exercise  TE 2: wrist flexion with forearm supported using 3#, 3x10; wrist extension with forearm supported using 3#, 3x10; forearm pronation to supination using 2# distal weight, 3x10; wrist radial to ulnar deviation using 3# distal, 3x10  TE 3: 7# digi-flex, 2x15  Therapeutic Activity  TA 1: blue flex bar smiles, blue flex bar frowns, and blue flex bar kali twists, 3x10 each  TA 3: chime ball in-hand manipulation, 3 min  TA 4: L3 gross grasp pulses, 3x10  TA 5: O'Filiberto dexterity pin placement using tweezers to place 30 pins  TA 6: L3 sustained grasp for 2 min  TA 7: blue digi-extend, 3x10  TA 8: black web WB for 3 second hold, 3x10  TA 9: ulnar deviation WB for 3 second hold, 3x10 using blue flex bar  Manual Therapy  MT 1: scar massage to right 5th metacarpal shaft incision to reduce hypersenstivity and scar tissue adhesions    Time Entry(in minutes):  Manual Therapy Time Entry: 8  Therapeutic Activity Time Entry: 35  Therapeutic Exercise Time Entry: 10    Assessment & Plan   Assessment: Since initial evaluation, patient has demonstrated functional range of motion and strength of the right hand. She demonstrated and reported no functional limitations in ADL/IADL. Mild numbness in the ulnar innervation along the 5th metacarpal persists, though this likely will reduce as she continues to heal. Patient demonstrated good understanding of progressive strengthening using increased theraputty resistance. She is appropriate for discharge at  this time.  Evaluation/Treatment Tolerance: Patient tolerated treatment well    The patient's spiritual, cultural, and educational needs were considered, and the patient is agreeable to the plan of care and goals.     Education  Education was done with Patient. The patient's learning style includes Demonstration and Listening. The patient Verbalizes understanding and Demonstrates understanding.                 Plan: discharge with [facility]    Goals:   Resolved       long-term       patient will demonstrate right hand  and pinch strength WNL needed to perform ADL/IADL tasks independently without difficulty. (Met)       Start:  07/09/25    Expected End:  08/27/25    Resolved:  08/07/25         patient will demonstrate independence in progressive strengthening home exercise program upon discharge. (Met)       Start:  07/09/25    Expected End:  08/27/25    Resolved:  08/07/25            short-term       Patient to be independent with home exercise program as issued by Occupational Therapist, noted through return demonstration to increase clinical carry over.  (Met)       Start:  07/09/25    Expected End:  08/06/25    Resolved:  07/15/25         patient will implement use of AE including dycem to enhance functional grasp strength of the right hand. (Met)       Start:  07/09/25    Expected End:  08/06/25    Resolved:  07/24/25         patient will report less guarding behaviors as evidenced by active use of the RUE in ADLs such as dressing and IADLs such as driving. (Met)       Start:  07/09/25    Expected End:  08/06/25    Resolved:  07/24/25             Christina Swan, OT

## (undated) DEVICE — SUT ETHILON 3-0 FS-1 30

## (undated) DEVICE — PENCIL ZIP PEN SMOKE EVAC 10FT

## (undated) DEVICE — GAUZE SPONGE 4X4 12PLY

## (undated) DEVICE — CLIPPER BLADE MOD 4406 (CAREF)

## (undated) DEVICE — GLOVE SENSICARE PI SURG 7

## (undated) DEVICE — SUT CTD VICRYL 4-0 P-3 18IN

## (undated) DEVICE — GLOVE SURG ULTRA TOUCH 7

## (undated) DEVICE — SPONGE GAUZE 16PLY 4X4

## (undated) DEVICE — GLOVE BIOGEL ECLIPSE SZ 7.5

## (undated) DEVICE — CANISTER SUCTION 3000CC

## (undated) DEVICE — GLOVE PROTEXIS PI SYN SURG 7

## (undated) DEVICE — ADHESIVE DERMABOND ADVANCED

## (undated) DEVICE — SUT 3-0 VICRYL / SH (J416)

## (undated) DEVICE — SUT CTD VICRYL 3-0 CR/SH

## (undated) DEVICE — UNDERGLOVES BIOGEL PI SZ 7 LF

## (undated) DEVICE — SEE MEDLINE ITEM 157116

## (undated) DEVICE — GLOVE SURG ULTRA TOUCH 7.5

## (undated) DEVICE — NDL ELECTRODE E-Z CLEAN 2.75IN

## (undated) DEVICE — SCRUB HIBICLENS 4% CHG 4OZ

## (undated) DEVICE — SUT CTD VICRYL 4-0 BR PS-2

## (undated) DEVICE — ELECTRODE REM PLYHSV RETURN 9

## (undated) DEVICE — COVER LIGHT HANDLE 80/CA

## (undated) DEVICE — SOL NACL IRR 1000ML BTL

## (undated) DEVICE — TOWEL OR DISP STRL BLUE 4/PK

## (undated) DEVICE — GLOVE PROTEXIS PI SYN SURG 7.5

## (undated) DEVICE — CANISTER SUCTION RIGID 3000CC

## (undated) DEVICE — PACK NASAL SINUS

## (undated) DEVICE — GLOVE SENSICARE PI SURG 6

## (undated) DEVICE — SEE MEDLINE ITEM 156964

## (undated) DEVICE — SYS CLSR DERMABOND PRINEO 22CM

## (undated) DEVICE — GLOVE PI ULTRA TOUCH G SURGEON

## (undated) DEVICE — ELECTRODE CAUTER TIP 2.75IN

## (undated) DEVICE — SOL 9P NACL IRR PIC IL

## (undated) DEVICE — SPONGE LAP 18X18 PREWASHED

## (undated) DEVICE — SUT PROLENE 4-0 SH 30IN BLU

## (undated) DEVICE — SEE MEDLINE ITEM 157148

## (undated) DEVICE — Device

## (undated) DEVICE — DRESSING TELFA PAD N ADH 2X3

## (undated) DEVICE — TRAY SKIN SCRUB WET PREMIUM